# Patient Record
Sex: MALE | Race: BLACK OR AFRICAN AMERICAN | NOT HISPANIC OR LATINO | Employment: FULL TIME | URBAN - METROPOLITAN AREA
[De-identification: names, ages, dates, MRNs, and addresses within clinical notes are randomized per-mention and may not be internally consistent; named-entity substitution may affect disease eponyms.]

---

## 2017-08-01 ENCOUNTER — HOSPITAL ENCOUNTER (EMERGENCY)
Facility: OTHER | Age: 32
Discharge: HOME | End: 2017-08-01
Attending: EMERGENCY MEDICINE
Payer: COMMERCIAL

## 2017-08-01 VITALS
DIASTOLIC BLOOD PRESSURE: 85 MMHG | OXYGEN SATURATION: 100 % | BODY MASS INDEX: 34.69 KG/M2 | TEMPERATURE: 102 F | HEART RATE: 102 BPM | RESPIRATION RATE: 20 BRPM | HEIGHT: 74 IN | SYSTOLIC BLOOD PRESSURE: 139 MMHG | WEIGHT: 270.31 LBS

## 2017-08-01 DIAGNOSIS — L03.115 CELLULITIS OF RIGHT LOWER EXTREMITY: Primary | ICD-10-CM

## 2017-08-01 PROCEDURE — 25000003 PHARM REV CODE 250: Performed by: EMERGENCY MEDICINE

## 2017-08-01 PROCEDURE — 99283 EMERGENCY DEPT VISIT LOW MDM: CPT

## 2017-08-01 RX ORDER — ACETAMINOPHEN 500 MG
1000 TABLET ORAL
Status: COMPLETED | OUTPATIENT
Start: 2017-08-01 | End: 2017-08-01

## 2017-08-01 RX ORDER — IBUPROFEN 400 MG/1
800 TABLET ORAL
Status: COMPLETED | OUTPATIENT
Start: 2017-08-01 | End: 2017-08-01

## 2017-08-01 RX ORDER — IBUPROFEN 800 MG/1
800 TABLET ORAL 3 TIMES DAILY PRN
Qty: 20 TABLET | Refills: 0 | Status: SHIPPED | OUTPATIENT
Start: 2017-08-01 | End: 2023-09-05

## 2017-08-01 RX ORDER — CEPHALEXIN 500 MG/1
500 CAPSULE ORAL 4 TIMES DAILY
Qty: 20 CAPSULE | Refills: 0 | Status: SHIPPED | OUTPATIENT
Start: 2017-08-01 | End: 2017-08-06

## 2017-08-01 RX ORDER — CEPHALEXIN 500 MG/1
500 CAPSULE ORAL
Status: COMPLETED | OUTPATIENT
Start: 2017-08-01 | End: 2017-08-01

## 2017-08-01 RX ADMIN — IBUPROFEN 800 MG: 400 TABLET, FILM COATED ORAL at 10:08

## 2017-08-01 RX ADMIN — ACETAMINOPHEN 1000 MG: 500 TABLET ORAL at 10:08

## 2017-08-01 RX ADMIN — CEPHALEXIN 500 MG: 500 CAPSULE ORAL at 10:08

## 2017-08-02 NOTE — ED NOTES
Pt c/o abscess to the R shin w redness and tenderness x 3 days, fever since yesterday and a toothache  1 week. No trauma per pt.

## 2017-08-02 NOTE — ED PROVIDER NOTES
Encounter Date: 8/1/2017    SCRIBE #1 NOTE: I, Mariana Yu, am scribing for, and in the presence of,  Dr. Vaughn. I have scribed the entire note.       History     Chief Complaint   Patient presents with    Leg Pain     tightness, stiffness to the RLE x 3 days, fever since yesterday, toothache x 1 week     Time seen by provider: 10:37 PM    This is a 32 y.o. male who presents with complaint of right leg pain. He reports onset of symptoms was 7 days ago. The patient states he was walking in the park prior to onset of symptoms. He notes he began walking for exercise. The patient describes the pain as sharp. He notes associated swelling but denies any redness, open wounds, drainage, numbness, tingling or weakness in the right leg. He denies any trauma to the leg.   In addition the patient complains of fever. He reports onset of symptoms was 1 day ago. The patient denies any cough, congestion, body aches, sore throat or headache but admits to fatigue. He initially attributed the symptoms to his wife having a cold. The patient denies any other sick contacts. He does note he has roxane having left sided dental pain. The patient states the pain has been present for a few days. He denies any associated facial swelling, gum swelling or redness.       The history is provided by the patient.     Review of patient's allergies indicates:  No Known Allergies  History reviewed. No pertinent past medical history.  History reviewed. No pertinent surgical history.  Family History   Problem Relation Age of Onset    Diabetes Mother     Hypertension Father      Social History   Substance Use Topics    Smoking status: Current Every Day Smoker     Packs/day: 0.50    Smokeless tobacco: Never Used    Alcohol use Yes      Comment: socially     Review of Systems   Constitutional: Positive for fever. Negative for chills.   HENT: Positive for dental problem. Negative for congestion and sore throat.    Eyes: Negative for redness and  visual disturbance.   Respiratory: Negative for cough and shortness of breath.    Cardiovascular: Negative for chest pain and palpitations.   Gastrointestinal: Negative for abdominal pain, diarrhea, nausea and vomiting.   Genitourinary: Negative for dysuria.   Musculoskeletal: Negative for back pain.        Right leg pain. Right shin swelling   Skin: Negative for rash.   Neurological: Negative for weakness and headaches.   Psychiatric/Behavioral: Negative for confusion.       Physical Exam     Initial Vitals [08/01/17 2109]   BP Pulse Resp Temp SpO2   139/85 102 20 (!) 101.9 °F (38.8 °C) 100 %      MAP       103         Physical Exam    Nursing note and vitals reviewed.  Constitutional: He appears well-developed and well-nourished. He is not diaphoretic. No distress.   HENT:   Head: Normocephalic and atraumatic.   Right Ear: External ear normal.   Left Ear: External ear normal.   No effusion or erythema to bilateral ears. Tenderness over left maxillary 3rd molar. No Tremaine's angina. Oropharynx is clear and intact. Moist mucus membranes   Eyes: Conjunctivae and EOM are normal.   Neck: Normal range of motion. Neck supple.   Cardiovascular: Normal rate, regular rhythm and normal heart sounds. Exam reveals no gallop and no friction rub.    No murmur heard.  Pulmonary/Chest: Breath sounds normal. He has no wheezes. He has no rhonchi. He has no rales.   Abdominal: Soft. Bowel sounds are normal. There is no tenderness. There is no rebound and no guarding.   Musculoskeletal: Normal range of motion. He exhibits tenderness. He exhibits no edema.   Right anterior shin 2 cm area of warmth and tenderness.   No significant erythema or streaking. No posterior calf tenderness or swelling. No thigh tenderness or swelling.    Lymphadenopathy:     He has no cervical adenopathy.   Neurological: He is alert and oriented to person, place, and time. He has normal strength.   Skin: Skin is warm and dry. No rash noted.         ED Course    Procedures  Labs Reviewed - No data to display          Medical Decision Making:   ED Management:  Well-appearing patient presents with a primary complaint of fever and malaise.  He thought the fever was secondary to a head cold that his wife had, but he has no URI type symptoms.  He does have findings concerning for a early cellulitis on the anterior shin.  No signs of abscess at this time.  No erythema or streaking to suggest lymphangitis.  He also reports of dental pain, so certainly a dental infection could be contributing.  I started him on Keflex which should cover both.  Counseled for recheck in 3 days in clinic, return here if worse.  I've written him off work as a  for 2 days.    I did have an extensive talk regarding signs to return for and need for follow up. Patient expressed understanding and will monitor symptoms closely and follow-up as needed.    REYNA Vaughn M.D.  08/02/2017  4:04 AM              Scribe Attestation:   Scribe #1: I performed the above scribed service and the documentation accurately describes the services I performed. I attest to the accuracy of the note.    Attending Attestation:           Physician Attestation for Scribe:  Physician Attestation Statement for Scribe #1: I, Dr. Vaughn, reviewed documentation, as scribed by Mariana Yu in my presence, and it is both accurate and complete.                 ED Course     Clinical Impression:     1. Cellulitis of right lower extremity                               Todd Vaughn MD  08/02/17 0404

## 2019-01-22 ENCOUNTER — HOSPITAL ENCOUNTER (EMERGENCY)
Facility: OTHER | Age: 34
Discharge: HOME | End: 2019-01-22
Attending: EMERGENCY MEDICINE
Payer: COMMERCIAL

## 2019-01-22 VITALS
TEMPERATURE: 98 F | RESPIRATION RATE: 18 BRPM | OXYGEN SATURATION: 100 % | DIASTOLIC BLOOD PRESSURE: 81 MMHG | HEIGHT: 74 IN | HEART RATE: 64 BPM | SYSTOLIC BLOOD PRESSURE: 150 MMHG | WEIGHT: 260 LBS | BODY MASS INDEX: 33.37 KG/M2

## 2019-01-22 DIAGNOSIS — R03.0 ELEVATED BLOOD PRESSURE READING: ICD-10-CM

## 2019-01-22 DIAGNOSIS — D64.9 ANEMIA, UNSPECIFIED TYPE: ICD-10-CM

## 2019-01-22 DIAGNOSIS — R07.9 CHEST PAIN: Primary | ICD-10-CM

## 2019-01-22 LAB
ALBUMIN SERPL BCP-MCNC: 4 G/DL
ALP SERPL-CCNC: 52 U/L
ALT SERPL W/O P-5'-P-CCNC: 14 U/L
ANION GAP SERPL CALC-SCNC: 11 MMOL/L
ANISOCYTOSIS BLD QL SMEAR: ABNORMAL
AST SERPL-CCNC: 16 U/L
BASOPHILS # BLD AUTO: 0.03 K/UL
BASOPHILS NFR BLD: 0.5 %
BILIRUB SERPL-MCNC: 1 MG/DL
BUN SERPL-MCNC: 11 MG/DL
CALCIUM SERPL-MCNC: 9.5 MG/DL
CHLORIDE SERPL-SCNC: 106 MMOL/L
CO2 SERPL-SCNC: 22 MMOL/L
CREAT SERPL-MCNC: 1.2 MG/DL
DIFFERENTIAL METHOD: ABNORMAL
EOSINOPHIL # BLD AUTO: 0.1 K/UL
EOSINOPHIL NFR BLD: 1.1 %
ERYTHROCYTE [DISTWIDTH] IN BLOOD BY AUTOMATED COUNT: 19.7 %
EST. GFR  (AFRICAN AMERICAN): >60 ML/MIN/1.73 M^2
EST. GFR  (NON AFRICAN AMERICAN): >60 ML/MIN/1.73 M^2
GIANT PLATELETS BLD QL SMEAR: PRESENT
GLUCOSE SERPL-MCNC: 88 MG/DL
HCT VFR BLD AUTO: 32.3 %
HGB BLD-MCNC: 9.6 G/DL
HYPOCHROMIA BLD QL SMEAR: ABNORMAL
LYMPHOCYTES # BLD AUTO: 1.9 K/UL
LYMPHOCYTES NFR BLD: 28.6 %
MCH RBC QN AUTO: 20.3 PG
MCHC RBC AUTO-ENTMCNC: 29.7 G/DL
MCV RBC AUTO: 68 FL
MONOCYTES # BLD AUTO: 0.7 K/UL
MONOCYTES NFR BLD: 10.1 %
NEUTROPHILS # BLD AUTO: 3.9 K/UL
NEUTROPHILS NFR BLD: 59.7 %
OVALOCYTES BLD QL SMEAR: ABNORMAL
PLATELET # BLD AUTO: 380 K/UL
PLATELET BLD QL SMEAR: ABNORMAL
PMV BLD AUTO: 8 FL
POIKILOCYTOSIS BLD QL SMEAR: SLIGHT
POLYCHROMASIA BLD QL SMEAR: ABNORMAL
POTASSIUM SERPL-SCNC: 3.8 MMOL/L
PROT SERPL-MCNC: 8.2 G/DL
RBC # BLD AUTO: 4.74 M/UL
SODIUM SERPL-SCNC: 139 MMOL/L
TROPONIN I SERPL DL<=0.01 NG/ML-MCNC: <0.006 NG/ML
WBC # BLD AUTO: 6.64 K/UL

## 2019-01-22 PROCEDURE — 93010 ELECTROCARDIOGRAM REPORT: CPT | Mod: ,,, | Performed by: INTERNAL MEDICINE

## 2019-01-22 PROCEDURE — 93010 EKG 12-LEAD: ICD-10-PCS | Mod: ,,, | Performed by: INTERNAL MEDICINE

## 2019-01-22 PROCEDURE — 93005 ELECTROCARDIOGRAM TRACING: CPT

## 2019-01-22 PROCEDURE — 80053 COMPREHEN METABOLIC PANEL: CPT

## 2019-01-22 PROCEDURE — 99285 EMERGENCY DEPT VISIT HI MDM: CPT

## 2019-01-22 PROCEDURE — 84484 ASSAY OF TROPONIN QUANT: CPT

## 2019-01-22 PROCEDURE — 85025 COMPLETE CBC W/AUTO DIFF WBC: CPT

## 2019-01-22 RX ORDER — FERROUS SULFATE 325(65) MG
325 TABLET ORAL DAILY
Qty: 30 TABLET | Refills: 0 | Status: SHIPPED | OUTPATIENT
Start: 2019-01-22 | End: 2023-09-05

## 2019-01-22 RX ORDER — DOCUSATE SODIUM 100 MG/1
100 CAPSULE, LIQUID FILLED ORAL 2 TIMES DAILY PRN
Qty: 60 CAPSULE | Refills: 0 | Status: SHIPPED | OUTPATIENT
Start: 2019-01-22 | End: 2023-09-05

## 2019-01-22 NOTE — ED NOTES
Two patient identifiers have been checked and are correct.      Appearance: Pt awake, alert & oriented to person, place & time. Pt in no acute distress at present time. Pt is clean and well groomed with clothes appropriately fastened.   Skin: Skin warm, dry & intact. Color consistent with ethnicity. Mucous membranes moist. No breakdown or brusing noted.   Musculoskeletal: Patient moving all extremities well, no obvious swelling or deformities noted.   Respiratory: Respirations spontaneous, even, and non-labored. Visible chest rise noted. Airway is open and patent. No accessory muscle use noted.   Neurologic: Sensation is intact. Speech is clear and appropriate. Eyes open spontaneously, behavior appropriate to situation, follows commands, facial expression symmetrical, bilateral hand grasp equal and even, purposeful motor response noted.  Cardiac: All peripheral pulses present. No Bilateral lower extremity edema. Cap refill is <3 seconds. CP described as tightness reported earlier this PM, radiation to L shoulder.  Abdomen: Abdomen soft, non-tender to palpation.   : Pt reports no dysuria or hematuria.

## 2019-01-22 NOTE — ED NOTES
"Pt presents to ED via self with complaints of CP. Pt reporting midsternal CP with radiation to L shoulder after having an argument with a family member this PM. Pt also reported L facial numbness that presented along with CP. CP is described as tightness. Pt is tearful in ED room, states "I wonder if stress can cause this".  Pt denies N/V, SOB, H/A. AAOx4, RR even and unlabored. Will continue to monitor.   "

## 2019-01-22 NOTE — ED PROVIDER NOTES
"Encounter Date: 2019    SCRIBE #1 NOTE: I, Emmanuel Vargheseial, am scribing for, and in the presence of, Dr. Chance.       History     Chief Complaint   Patient presents with    Chest Pain     Pt came to the ED tonight c.o. chest pain and left arm numbness, s/p having an argument with his daughter and having to call police     Time seen by provider: 5:17 PM    This is a 33 y.o. male who presents with complaint of chest pain that began approximately one hour ago. The patient's wife reports that he "collapsed on the floor" with chest pain and numbness to his face and left hand. He states that he is also experiencing a cough and occasionally coughs up blood. He reports that he was on the phone with his child's mother and they got into an argument right before his symptoms began. She reports that she gave him three aspirin and then brought him to the ED. The patient reports that he has been under a lot of stress lately and that he has been feeling depressed. 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 He states that he is always experiencing chest pain like this, but it has never been that bad before. He denies fever, sore throat, shortness of breath, nausea, dysuria, and suicidal ideations. The patient reports that his father  of a heart attack. The patient reports that he smoked cigarettes in the past, but denies illicit drug use.       The history is provided by the patient and the spouse.     Review of patient's allergies indicates:  No Known Allergies  History reviewed. No pertinent past medical history.  Past Surgical History:   Procedure Laterality Date    CIRCUMCISION N/A 2013    Performed by George Leiva MD at [facility] OR [address] [place] [address]     Family History   Problem Relation Age of Onset    Diabetes Mother     Hypertension Father      Social History     Tobacco Use    Smoking status: Current Every Day Smoker     Types: Vaping with " nicotine    Smokeless tobacco: Never Used   Substance Use Topics    Alcohol use: Yes     Comment: socially    Drug use: No     Review of Systems   Constitutional: Negative for fever.   HENT: Negative for sore throat.    Respiratory: Positive for cough (occasionally coughing up blood). Negative for shortness of breath.    Cardiovascular: Positive for chest pain.   Gastrointestinal: Negative for nausea.   Genitourinary: Negative for dysuria.   Musculoskeletal: Negative for back pain.   Skin: Negative for rash.   Neurological: Positive for numbness (to his face and hand). Negative for weakness.   Hematological: Does not bruise/bleed easily.   Psychiatric/Behavioral: Negative for suicidal ideas.       Physical Exam     Initial Vitals [01/22/19 1658]   BP Pulse Resp Temp SpO2   (!) 158/93 71 16 98.3 °F (36.8 °C) 100 %      MAP       --         Physical Exam    Nursing note and vitals reviewed.  Constitutional: He appears well-developed and well-nourished. He is not diaphoretic. He appears distressed.   Tearful. Withdrawn affect.    HENT:   Head: Normocephalic and atraumatic.   Mouth/Throat: Oropharynx is clear and moist.   Eyes: Conjunctivae and EOM are normal. Pupils are equal, round, and reactive to light.   Neck: No JVD present.   Cardiovascular: Normal rate, regular rhythm and normal heart sounds. Exam reveals no gallop and no friction rub.    No murmur heard.  Pulmonary/Chest: Breath sounds normal. No respiratory distress. He has no wheezes. He has no rhonchi. He has no rales. He exhibits no tenderness.   Abdominal: Soft. There is no tenderness. There is no rebound and no guarding.   Musculoskeletal: Normal range of motion. He exhibits no edema or tenderness.   No calf tenderness.   Neurological: He is alert and oriented to person, place, and time.   Skin: Skin is warm and dry. No rash and no abscess noted. No erythema. No pallor.   Psychiatric: His behavior is normal. Judgment and thought content normal. He  exhibits a depressed mood. He expresses no suicidal ideation.         ED Course   Procedures  Labs Reviewed   CBC W/ AUTO DIFFERENTIAL - Abnormal; Notable for the following components:       Result Value    Hemoglobin 9.6 (*)     Hematocrit 32.3 (*)     MCV 68 (*)     MCH 20.3 (*)     MCHC 29.7 (*)     RDW 19.7 (*)     Platelets 380 (*)     MPV 8.0 (*)     All other components within normal limits   COMPREHENSIVE METABOLIC PANEL - Abnormal; Notable for the following components:    CO2 22 (*)     Alkaline Phosphatase 52 (*)     All other components within normal limits    Narrative:     Recoll. [id number] by SD at 01/22/2019 18:06, reason: Specimen   hemolyzed, Johanny Zamora RN   TROPONIN I    Narrative:     Recoll. [id number] by SD at 01/22/2019 18:06, reason: Specimen   hemolyzed, Johanny Zamora RN     EKG Readings: (Independently Interpreted)   Initial Reading: No STEMI.   Normal sinus rhythm at a rate of 71 bpm. Right axis deviation. Narrow QRS. No priors to compare it to.        Imaging Results          X-Ray Chest PA And Lateral (Final result)  Result time 01/22/19 19:03:25    Final result by Merry Bragg MD (01/22/19 19:03:25)                 Impression:      No intrathoracic disease identified.  The source of the patient's chest pain is not established on this study.      Electronically signed by: Merry Bragg MD  Date:    01/22/2019  Time:    19:03             Narrative:    EXAMINATION:  XR CHEST PA AND LATERAL    CLINICAL HISTORY:  Chest Pain;    TECHNIQUE:  PA and lateral views of the chest were performed.    COMPARISON:  None    FINDINGS:  Mediastinal structures are midline. Cardiac silhouette and pulmonary vascular distribution are normal.    Lung volumes are normal and symmetric. I detect no pulmonary disease, pleural fluid, lymph node enlargement, cardiac decompensation, pneumothorax, pneumomediastinum, pneumoperitoneum or significant osseous abnormality.                               X-Rays:   Independently Interpreted Readings:   Chest X-Ray: No cardiomegaly. No focal infiltrate. No pneumothorax.      Medical Decision Making:   Initial Assessment:   Urgent evaluation of 33-year-old gentleman here with complaints of left-sided chest pain that occurred at approximately 4:00 p.m..  Patient reports being overly stressed with his work as a , and interpersonal issues with an ex-wife, and his daughter, has had done he did phone conversation with them prior to the onset of his discomfort.  Patient then developed numbness sensation to the left upper extremity at that time.  Wife gave him aspirin, and presented to the emergency department.  Currently patient is chest pain-free, has no complaints of paresthesias, no shortness of breath, but reports having a chronic cough.  Patient does have family history of MI in his father, patient denies smoking, or drug abuse.  On exam patient is tearful, withdrawn, endorses depressed thoughts,, but no suicidal or homicidal ideation at this time.  Will plan to evaluate for cardiac etiology, with troponin, chest x-ray, and reassess.  Clinical Tests:   Lab Tests: Ordered and Reviewed  Radiological Study: Ordered and Reviewed  Medical Tests: Ordered and Reviewed  ED Management:   Patient made aware findings of anemia, significant from priors, the patient denies abdominal pain, no history of peptic ulcers, and no rectal bleeding.  Patient encouraged to follow with the PCP regarding these results, as well as given my concern for symptoms of depression.  Cardiac enzymes negative, and patient not desiring to stay for serial troponin, despite my suggestion that we cannot fully rule out a cardiac cause of his episode of discomfort at this time.  Patient wife seem reliable, agree that if pain returns, they will call 911 and return to the ED.            Scribe Attestation:   Scribe #1: I performed the above scribed service and the documentation accurately  describes the services I performed. I attest to the accuracy of the note.    Attending Attestation:           Physician Attestation for Scribe:  Physician Attestation Statement for Scribe #1: I, Dr. Chance, reviewed documentation, as scribed by Emmanuel Ponce in my presence, and it is both accurate and complete.                    Clinical Impression:     1. Chest pain    2. Anemia, unspecified type    3. Elevated blood pressure reading            Disposition:   Disposition: Discharged  Condition: Fair                        Lety Chance MD  01/22/19 1936

## 2021-05-20 ENCOUNTER — HOSPITAL ENCOUNTER (EMERGENCY)
Facility: OTHER | Age: 36
Discharge: HOME | End: 2021-05-20
Attending: EMERGENCY MEDICINE
Payer: COMMERCIAL

## 2021-05-20 VITALS
TEMPERATURE: 98 F | HEART RATE: 77 BPM | WEIGHT: 250 LBS | RESPIRATION RATE: 16 BRPM | HEIGHT: 74 IN | BODY MASS INDEX: 32.08 KG/M2 | OXYGEN SATURATION: 100 % | DIASTOLIC BLOOD PRESSURE: 78 MMHG | SYSTOLIC BLOOD PRESSURE: 130 MMHG

## 2021-05-20 DIAGNOSIS — I82.890 SUPERFICIAL VEIN THROMBOSIS: Primary | ICD-10-CM

## 2021-05-20 DIAGNOSIS — M79.606 LEG PAIN: ICD-10-CM

## 2021-05-20 DIAGNOSIS — D64.9 ANEMIA, UNSPECIFIED TYPE: ICD-10-CM

## 2021-05-20 LAB
ANION GAP SERPL CALC-SCNC: 7 MMOL/L (ref 8–16)
BASOPHILS # BLD AUTO: 0.04 K/UL (ref 0–0.2)
BASOPHILS NFR BLD: 0.6 % (ref 0–1.9)
BUN SERPL-MCNC: 11 MG/DL (ref 6–20)
CALCIUM SERPL-MCNC: 8.9 MG/DL (ref 8.7–10.5)
CHLORIDE SERPL-SCNC: 106 MMOL/L (ref 95–110)
CO2 SERPL-SCNC: 25 MMOL/L (ref 23–29)
CREAT SERPL-MCNC: 1.1 MG/DL (ref 0.5–1.4)
DIFFERENTIAL METHOD: ABNORMAL
EOSINOPHIL # BLD AUTO: 0.1 K/UL (ref 0–0.5)
EOSINOPHIL NFR BLD: 1.7 % (ref 0–8)
ERYTHROCYTE [DISTWIDTH] IN BLOOD BY AUTOMATED COUNT: 22.2 % (ref 11.5–14.5)
EST. GFR  (AFRICAN AMERICAN): >60 ML/MIN/1.73 M^2
EST. GFR  (NON AFRICAN AMERICAN): >60 ML/MIN/1.73 M^2
GLUCOSE SERPL-MCNC: 97 MG/DL (ref 70–110)
HCT VFR BLD AUTO: 30.2 % (ref 40–54)
HGB BLD-MCNC: 8.3 G/DL (ref 14–18)
IMM GRANULOCYTES # BLD AUTO: 0.02 K/UL (ref 0–0.04)
IMM GRANULOCYTES NFR BLD AUTO: 0.3 % (ref 0–0.5)
LYMPHOCYTES # BLD AUTO: 1.8 K/UL (ref 1–4.8)
LYMPHOCYTES NFR BLD: 24.8 % (ref 18–48)
MCH RBC QN AUTO: 18.2 PG (ref 27–31)
MCHC RBC AUTO-ENTMCNC: 27.5 G/DL (ref 32–36)
MCV RBC AUTO: 66 FL (ref 82–98)
MONOCYTES # BLD AUTO: 0.8 K/UL (ref 0.3–1)
MONOCYTES NFR BLD: 10.8 % (ref 4–15)
NEUTROPHILS # BLD AUTO: 4.5 K/UL (ref 1.8–7.7)
NEUTROPHILS NFR BLD: 61.8 % (ref 38–73)
NRBC BLD-RTO: 0 /100 WBC
PLATELET # BLD AUTO: 510 K/UL (ref 150–450)
PMV BLD AUTO: 8.1 FL (ref 9.2–12.9)
POTASSIUM SERPL-SCNC: 4 MMOL/L (ref 3.5–5.1)
RBC # BLD AUTO: 4.56 M/UL (ref 4.6–6.2)
SODIUM SERPL-SCNC: 138 MMOL/L (ref 136–145)
WBC # BLD AUTO: 7.22 K/UL (ref 3.9–12.7)

## 2021-05-20 PROCEDURE — 63600175 PHARM REV CODE 636 W HCPCS: Performed by: NURSE PRACTITIONER

## 2021-05-20 PROCEDURE — 96374 THER/PROPH/DIAG INJ IV PUSH: CPT

## 2021-05-20 PROCEDURE — 80048 BASIC METABOLIC PNL TOTAL CA: CPT | Performed by: NURSE PRACTITIONER

## 2021-05-20 PROCEDURE — 85025 COMPLETE CBC W/AUTO DIFF WBC: CPT | Performed by: NURSE PRACTITIONER

## 2021-05-20 PROCEDURE — 99284 EMERGENCY DEPT VISIT MOD MDM: CPT | Mod: 25

## 2021-05-20 RX ORDER — KETOROLAC TROMETHAMINE 30 MG/ML
30 INJECTION, SOLUTION INTRAMUSCULAR; INTRAVENOUS
Status: COMPLETED | OUTPATIENT
Start: 2021-05-20 | End: 2021-05-20

## 2021-05-20 RX ORDER — NAPROXEN 375 MG/1
375 TABLET ORAL 2 TIMES DAILY WITH MEALS
Qty: 60 TABLET | Refills: 0 | Status: SHIPPED | OUTPATIENT
Start: 2021-05-20 | End: 2023-09-05

## 2021-05-20 RX ORDER — DICLOFENAC SODIUM 10 MG/G
2 GEL TOPICAL 4 TIMES DAILY
Qty: 100 G | Refills: 0 | Status: SHIPPED | OUTPATIENT
Start: 2021-05-20 | End: 2023-09-05

## 2021-05-20 RX ADMIN — KETOROLAC TROMETHAMINE 30 MG: 30 INJECTION, SOLUTION INTRAMUSCULAR; INTRAVENOUS at 09:05

## 2021-05-21 ENCOUNTER — TELEPHONE (OUTPATIENT)
Dept: INTERNAL MEDICINE | Facility: CLINIC | Age: 36
End: 2021-05-21

## 2023-09-05 ENCOUNTER — HOSPITAL ENCOUNTER (OUTPATIENT)
Facility: OTHER | Age: 38
LOS: 1 days | Discharge: HOME | End: 2023-09-06
Attending: HOSPITALIST | Admitting: HOSPITALIST
Payer: COMMERCIAL

## 2023-09-05 DIAGNOSIS — I82.412 ACUTE DEEP VEIN THROMBOSIS (DVT) OF FEMORAL VEIN OF LEFT LOWER EXTREMITY: ICD-10-CM

## 2023-09-05 DIAGNOSIS — I82.409 DVT (DEEP VENOUS THROMBOSIS): ICD-10-CM

## 2023-09-05 DIAGNOSIS — M79.89 LEG SWELLING: ICD-10-CM

## 2023-09-05 DIAGNOSIS — I26.99 BILATERAL PULMONARY EMBOLISM: ICD-10-CM

## 2023-09-05 DIAGNOSIS — D50.9 IRON DEFICIENCY ANEMIA, UNSPECIFIED IRON DEFICIENCY ANEMIA TYPE: Primary | ICD-10-CM

## 2023-09-05 DIAGNOSIS — I82.402 ACUTE DEEP VEIN THROMBOSIS (DVT) OF LEFT LOWER EXTREMITY: ICD-10-CM

## 2023-09-05 LAB
ALBUMIN SERPL BCP-MCNC: 3.7 G/DL (ref 3.5–5.2)
ALP SERPL-CCNC: 50 U/L (ref 55–135)
ALT SERPL W/O P-5'-P-CCNC: 12 U/L (ref 10–44)
ANION GAP SERPL CALC-SCNC: 8 MMOL/L (ref 8–16)
APTT PPP: 27.4 SEC (ref 21–32)
AST SERPL-CCNC: 13 U/L (ref 10–40)
BASOPHILS # BLD AUTO: 0.04 K/UL (ref 0–0.2)
BASOPHILS # BLD AUTO: 0.04 K/UL (ref 0–0.2)
BASOPHILS NFR BLD: 0.5 % (ref 0–1.9)
BASOPHILS NFR BLD: 0.5 % (ref 0–1.9)
BILIRUB SERPL-MCNC: 0.8 MG/DL (ref 0.1–1)
BUN SERPL-MCNC: 10 MG/DL (ref 6–20)
CALCIUM SERPL-MCNC: 9.1 MG/DL (ref 8.7–10.5)
CHLORIDE SERPL-SCNC: 111 MMOL/L (ref 95–110)
CO2 SERPL-SCNC: 20 MMOL/L (ref 23–29)
CREAT SERPL-MCNC: 1.1 MG/DL (ref 0.5–1.4)
DIFFERENTIAL METHOD: ABNORMAL
DIFFERENTIAL METHOD: ABNORMAL
EOSINOPHIL # BLD AUTO: 0.2 K/UL (ref 0–0.5)
EOSINOPHIL # BLD AUTO: 0.2 K/UL (ref 0–0.5)
EOSINOPHIL NFR BLD: 2.3 % (ref 0–8)
EOSINOPHIL NFR BLD: 2.3 % (ref 0–8)
ERYTHROCYTE [DISTWIDTH] IN BLOOD BY AUTOMATED COUNT: 22.5 % (ref 11.5–14.5)
ERYTHROCYTE [DISTWIDTH] IN BLOOD BY AUTOMATED COUNT: 22.5 % (ref 11.5–14.5)
EST. GFR  (NO RACE VARIABLE): >60 ML/MIN/1.73 M^2
FERRITIN SERPL-MCNC: 13 NG/ML (ref 20–300)
GLUCOSE SERPL-MCNC: 116 MG/DL (ref 70–110)
HCT VFR BLD AUTO: 27.9 % (ref 40–54)
HCT VFR BLD AUTO: 28.9 % (ref 40–54)
HGB BLD-MCNC: 7.4 G/DL (ref 14–18)
HGB BLD-MCNC: 7.6 G/DL (ref 14–18)
IMM GRANULOCYTES # BLD AUTO: 0.02 K/UL (ref 0–0.04)
IMM GRANULOCYTES # BLD AUTO: 0.02 K/UL (ref 0–0.04)
IMM GRANULOCYTES NFR BLD AUTO: 0.3 % (ref 0–0.5)
IMM GRANULOCYTES NFR BLD AUTO: 0.3 % (ref 0–0.5)
INR PPP: 1 (ref 0.8–1.2)
IRON SERPL-MCNC: 23 UG/DL (ref 45–160)
LDH SERPL L TO P-CCNC: 162 U/L (ref 110–260)
LYMPHOCYTES # BLD AUTO: 1.5 K/UL (ref 1–4.8)
LYMPHOCYTES # BLD AUTO: 1.8 K/UL (ref 1–4.8)
LYMPHOCYTES NFR BLD: 19.9 % (ref 18–48)
LYMPHOCYTES NFR BLD: 24.1 % (ref 18–48)
MCH RBC QN AUTO: 17.2 PG (ref 27–31)
MCH RBC QN AUTO: 17.4 PG (ref 27–31)
MCHC RBC AUTO-ENTMCNC: 26.3 G/DL (ref 32–36)
MCHC RBC AUTO-ENTMCNC: 26.5 G/DL (ref 32–36)
MCV RBC AUTO: 66 FL (ref 82–98)
MCV RBC AUTO: 66 FL (ref 82–98)
MONOCYTES # BLD AUTO: 0.6 K/UL (ref 0.3–1)
MONOCYTES # BLD AUTO: 0.8 K/UL (ref 0.3–1)
MONOCYTES NFR BLD: 10.7 % (ref 4–15)
MONOCYTES NFR BLD: 7.9 % (ref 4–15)
NEUTROPHILS # BLD AUTO: 4.7 K/UL (ref 1.8–7.7)
NEUTROPHILS # BLD AUTO: 5.3 K/UL (ref 1.8–7.7)
NEUTROPHILS NFR BLD: 62.1 % (ref 38–73)
NEUTROPHILS NFR BLD: 69.1 % (ref 38–73)
NRBC BLD-RTO: 0 /100 WBC
NRBC BLD-RTO: 0 /100 WBC
PLATELET # BLD AUTO: 345 K/UL (ref 150–450)
PLATELET # BLD AUTO: 352 K/UL (ref 150–450)
PMV BLD AUTO: 8.6 FL (ref 9.2–12.9)
PMV BLD AUTO: 8.9 FL (ref 9.2–12.9)
POTASSIUM SERPL-SCNC: 3.8 MMOL/L (ref 3.5–5.1)
PROT SERPL-MCNC: 7.4 G/DL (ref 6–8.4)
PROTHROMBIN TIME: 11.2 SEC (ref 9–12.5)
RBC # BLD AUTO: 4.25 M/UL (ref 4.6–6.2)
RBC # BLD AUTO: 4.41 M/UL (ref 4.6–6.2)
RETICS/RBC NFR AUTO: 1.8 % (ref 0.4–2)
SATURATED IRON: 6 % (ref 20–50)
SODIUM SERPL-SCNC: 139 MMOL/L (ref 136–145)
TOTAL IRON BINDING CAPACITY: 414 UG/DL (ref 250–450)
TRANSFERRIN SERPL-MCNC: 280 MG/DL (ref 200–375)
WBC # BLD AUTO: 7.48 K/UL (ref 3.9–12.7)
WBC # BLD AUTO: 7.72 K/UL (ref 3.9–12.7)

## 2023-09-05 PROCEDURE — 84466 ASSAY OF TRANSFERRIN: CPT | Performed by: PHYSICIAN ASSISTANT

## 2023-09-05 PROCEDURE — 85610 PROTHROMBIN TIME: CPT | Performed by: PHYSICIAN ASSISTANT

## 2023-09-05 PROCEDURE — 36415 COLL VENOUS BLD VENIPUNCTURE: CPT | Performed by: NURSE PRACTITIONER

## 2023-09-05 PROCEDURE — 83540 ASSAY OF IRON: CPT | Performed by: PHYSICIAN ASSISTANT

## 2023-09-05 PROCEDURE — 80053 COMPREHEN METABOLIC PANEL: CPT | Performed by: PHYSICIAN ASSISTANT

## 2023-09-05 PROCEDURE — 36415 COLL VENOUS BLD VENIPUNCTURE: CPT | Performed by: PHYSICIAN ASSISTANT

## 2023-09-05 PROCEDURE — 96376 TX/PRO/DX INJ SAME DRUG ADON: CPT | Mod: 59

## 2023-09-05 PROCEDURE — G0378 HOSPITAL OBSERVATION PER HR: HCPCS

## 2023-09-05 PROCEDURE — 85025 COMPLETE CBC W/AUTO DIFF WBC: CPT | Performed by: PHYSICIAN ASSISTANT

## 2023-09-05 PROCEDURE — 99285 EMERGENCY DEPT VISIT HI MDM: CPT | Mod: 25

## 2023-09-05 PROCEDURE — 83615 LACTATE (LD) (LDH) ENZYME: CPT | Performed by: NURSE PRACTITIONER

## 2023-09-05 PROCEDURE — 36415 COLL VENOUS BLD VENIPUNCTURE: CPT | Performed by: HOSPITALIST

## 2023-09-05 PROCEDURE — 99222 PR INITIAL HOSPITAL CARE,LEVL II: ICD-10-PCS | Mod: ,,, | Performed by: NURSE PRACTITIONER

## 2023-09-05 PROCEDURE — 85730 THROMBOPLASTIN TIME PARTIAL: CPT | Mod: 91 | Performed by: HOSPITALIST

## 2023-09-05 PROCEDURE — 85730 THROMBOPLASTIN TIME PARTIAL: CPT | Performed by: PHYSICIAN ASSISTANT

## 2023-09-05 PROCEDURE — 96365 THER/PROPH/DIAG IV INF INIT: CPT

## 2023-09-05 PROCEDURE — 63600175 PHARM REV CODE 636 W HCPCS: Performed by: PHYSICIAN ASSISTANT

## 2023-09-05 PROCEDURE — 96366 THER/PROPH/DIAG IV INF ADDON: CPT

## 2023-09-05 PROCEDURE — 85025 COMPLETE CBC W/AUTO DIFF WBC: CPT | Mod: 91 | Performed by: PHYSICIAN ASSISTANT

## 2023-09-05 PROCEDURE — 25500020 PHARM REV CODE 255: Performed by: HOSPITALIST

## 2023-09-05 PROCEDURE — 82728 ASSAY OF FERRITIN: CPT | Performed by: PHYSICIAN ASSISTANT

## 2023-09-05 PROCEDURE — 99222 1ST HOSP IP/OBS MODERATE 55: CPT | Mod: ,,, | Performed by: NURSE PRACTITIONER

## 2023-09-05 PROCEDURE — 85045 AUTOMATED RETICULOCYTE COUNT: CPT | Performed by: NURSE PRACTITIONER

## 2023-09-05 RX ORDER — HEPARIN SODIUM,PORCINE/D5W 25000/250
0-40 INTRAVENOUS SOLUTION INTRAVENOUS CONTINUOUS
Status: DISCONTINUED | OUTPATIENT
Start: 2023-09-05 | End: 2023-09-06

## 2023-09-05 RX ORDER — HYDROCODONE BITARTRATE AND ACETAMINOPHEN 10; 325 MG/1; MG/1
1 TABLET ORAL EVERY 6 HOURS PRN
Status: DISCONTINUED | OUTPATIENT
Start: 2023-09-05 | End: 2023-09-06 | Stop reason: HOSPADM

## 2023-09-05 RX ORDER — ACETAMINOPHEN 325 MG/1
650 TABLET ORAL EVERY 6 HOURS PRN
Status: DISCONTINUED | OUTPATIENT
Start: 2023-09-05 | End: 2023-09-06 | Stop reason: HOSPADM

## 2023-09-05 RX ADMIN — HEPARIN SODIUM 18 UNITS/KG/HR: 10000 INJECTION, SOLUTION INTRAVENOUS at 03:09

## 2023-09-05 RX ADMIN — IOHEXOL 100 ML: 350 INJECTION, SOLUTION INTRAVENOUS at 02:09

## 2023-09-05 RX ADMIN — HEPARIN SODIUM 21 UNITS/KG/HR: 10000 INJECTION, SOLUTION INTRAVENOUS at 09:09

## 2023-09-05 NOTE — ASSESSMENT & PLAN NOTE
Hgb 7.4, Hct 27.9, Plts 345, MCV 66  Patient denies blood loss, dark stool  -Check occult   -Iron/TIBC/Transferrin/LDH/Retics pending

## 2023-09-05 NOTE — PLAN OF CARE
1500  Pt sent for CT.    5:25 PM  Aptt at 27.4. hep gtt will be adjusted per high nom, as ordered.  Q 6 lab reordered for 1900.   Latest Reference Range & Units 09/05/23 12:54   aPTT 21.0 - 32.0 sec 27.4       6:02 PM  Heparin Dose adjusted. Aptt to be drawn at 23:46.  Telemetry contd.   Resting comfortably in bed at this time.  VSS on RA and afebrile this shift. Good appetite and good UOP this shift, independent to urinal.Repositions self independently in bed.  Free from injury or skin breakdown; Fall precautions maintained and call light in reach.  POC updated questions answered and comments acknowledged.  Purposeful hourly rounding completed this shift.

## 2023-09-05 NOTE — H&P
"[facility] ([address] Medicine  History & Physical    Patient Name: Andi Min  MRN: [id number]  Patient Class: OP- Observation  Admission Date: 9/5/2023  Attending Physician: Buster Branch MD   Primary Care Provider: Jackie Primary Doctor         Patient information was obtained from patient, past medical records and ER records.     Subjective:     Principal Problem:Acute deep vein thrombosis (DVT) of left lower extremity    Chief Complaint:   Chief Complaint   Patient presents with    Leg Pain     Pt reporting L leg pain and swelling x 1 week. Denies injury to area. Upon assessment, L leg appears swollen from thigh to ankle. Pt ambulatory upon arrival to ED. Pt reporting same L leg pain x 2 years ago and was told "I maybe have a torn meniscus and I was supposed to have a MRI done, but didn't go."         HPI: Mr Escamilla is a 38 year old male with a previous medical history of left torn meniscus, a superficial venous thrombosis 2 years ago, and he does not take any medications. Mr Escamilla came to the ED for pain and swelling to his left lower leg. It began about one week ago. He denies any trauma, prolonged immobilization, or other injury to his left leg. The pain and swelling does affect his mobility. The pain is worse with movement. He did take an extended car ride over the weekend to [place] this weekend but he already had the swelling and pain.  Patient denies back pain, decreased sensation, and denies shortness of breath.     In the emergency room, Mr Escamilla had an ultrasound of his lower extremities which revealed a nearly occlusive thrombosis involving the left femoral vein, popliteal vein, and posterior tibial and peroneal veins. Interventional radiology was contact by ED provider. Patient to be started on heparin drip and have a thrombectomy tomorrow. Plan of care discussed with patient and his wife who is a bedside. Patient admitted to hospital medicine for further " management.       History reviewed. No pertinent past medical history.    History reviewed. No pertinent surgical history.    Review of patient's allergies indicates:  No Known Allergies    No current facility-administered medications on file prior to encounter.     Current Outpatient Medications on File Prior to Encounter   Medication Sig    [DISCONTINUED] diclofenac sodium (VOLTAREN) 1 % Gel Apply 2 g topically 4 (four) times daily. for 10 days    [DISCONTINUED] docusate sodium (COLACE) 100 MG capsule Take 1 capsule (100 mg total) by mouth 2 (two) times daily as needed for Constipation.    [DISCONTINUED] ferrous sulfate (FEOSOL) 325 mg (65 mg iron) Tab tablet Take 1 tablet (325 mg total) by mouth once daily.    [DISCONTINUED] ibuprofen (ADVIL,MOTRIN) 800 MG tablet Take 1 tablet (800 mg total) by mouth 3 (three) times daily as needed.    [DISCONTINUED] naproxen (NAPROSYN) 375 MG tablet Take 1 tablet (375 mg total) by mouth 2 (two) times daily with meals.     Family History       Problem Relation (Age of Onset)    Diabetes Mother    Hypertension Father          Tobacco Use    Smoking status: Every Day     Types: Vaping with nicotine    Smokeless tobacco: Never   Substance and Sexual Activity    Alcohol use: Yes     Comment: socially    Drug use: No    Sexual activity: Yes     Partners: Female     Review of Systems   Constitutional:  Negative for appetite change and fatigue.   Respiratory:  Negative for cough, chest tightness and shortness of breath.    Cardiovascular:  Positive for leg swelling. Negative for chest pain.   Gastrointestinal:  Negative for abdominal distention, abdominal pain, blood in stool, constipation, diarrhea and vomiting.   Genitourinary:  Negative for difficulty urinating.   Musculoskeletal:  Negative for arthralgias and back pain.   Neurological:  Negative for dizziness.     Objective:     Vital Signs (Most Recent):  Temp: 98.6 °F (37 °C) (09/05/23 1350)  Pulse: 80 (09/05/23  1350)  Resp: 18 (09/05/23 1350)  BP: 132/73 (09/05/23 1350)  SpO2: 100 % (09/05/23 1350) Vital Signs (24h Range):  Temp:  [98.1 °F (36.7 °C)-98.6 °F (37 °C)] 98.6 °F (37 °C)  Pulse:  [77-91] 80  Resp:  [15-18] 18  SpO2:  [99 %-100 %] 100 %  BP: (130-133)/(71-75) 132/73     Weight: 117.9 kg (259 lb 14.8 oz)  Body mass index is 33.37 kg/m².     Physical Exam  Vitals reviewed.   HENT:      Head: Normocephalic.   Eyes:      Pupils: Pupils are equal, round, and reactive to light.   Cardiovascular:      Rate and Rhythm: Normal rate.      Comments: BL dorsalis pedis 2+  Pulmonary:      Effort: Pulmonary effort is normal.      Breath sounds: Normal breath sounds. No wheezing.   Chest:      Chest wall: No tenderness.   Abdominal:      General: There is no distension.      Tenderness: There is no abdominal tenderness. There is no guarding.   Musculoskeletal:         General: Tenderness (LLE) present. Normal range of motion.      Left lower leg: Edema (nonpitting) present.   Skin:     Findings: Erythema (LLE) present.   Neurological:      General: No focal deficit present.      Mental Status: He is alert.              CRANIAL NERVES     CN III, IV, VI   Pupils are equal, round, and reactive to light.       Significant Labs: All pertinent labs within the past 24 hours have been reviewed.  BMP:   Recent Labs   Lab 09/05/23  1047   *      K 3.8   *   CO2 20*   BUN 10   CREATININE 1.1   CALCIUM 9.1     CBC:   Recent Labs   Lab 09/05/23  1047 09/05/23  1254   WBC 7.72 7.48   HGB 7.6* 7.4*   HCT 28.9* 27.9*    345     CMP:   Recent Labs   Lab 09/05/23  1047      K 3.8   *   CO2 20*   *   BUN 10   CREATININE 1.1   CALCIUM 9.1   PROT 7.4   ALBUMIN 3.7   BILITOT 0.8   ALKPHOS 50*   AST 13   ALT 12   ANIONGAP 8       Significant Imaging: I have reviewed all pertinent imaging results/findings within the past 24 hours.  US Lower Extremity Veins Left  Narrative: EXAMINATION:  US LOWER EXTREMITY  VEINS LEFT    CLINICAL HISTORY:  Other specified soft tissue disorders    TECHNIQUE:  Duplex and color flow Doppler evaluation and graded compression of the left lower extremity veins was performed.    COMPARISON:  05/20/2021    FINDINGS:  The common femoral is patent.  The left mid and distal femoral vein are thrombosed.  The left popliteal vein is thrombosed.  The left posterior tibial and peroneal veins are thrombosed.  The anterior tibial vein is patent.  The greater saphenous vein is patent.    Contralateral CFV: The contralateral (right) common femoral vein is patent and free of thrombus.    Miscellaneous: None  Impression: Nearly occlusive thrombus involving the left femoral vein, popliteal vein, posterior tibial and peroneal veins.    Result relayed to Nurse Davis providing care for this patient which will relay to attending.  11:35.    No EPIC response from the ordering provider.    I could not reach Jolene RICHARDS by phone.    Electronically signed by: Zena Armendariz MD  Date:    09/05/2023  Time:    11:36        Assessment/Plan:     * Acute deep vein thrombosis (DVT) of left lower extremity  Left lower extremity swelling and erythema.  US reports nearly occlusive thrombus involving the left femoral vein, popliteal vein, posterior tibial and peroneal veins.  Interventional Radiology to perform a thrombectomy on Wednesday. Heparin drip started at this time for anticoagulation. Evaluating for acquired vs inherited etiology. Patient's father has had a DVT in the past. Patient denies trauma, immobilization.  CT abdomen pelvis and CT non-coronary ordered. Will refer to hematology outpatient.       Iron deficiency anemia  Hgb 7.4, Hct 27.9, Plts 345, MCV 66  Patient denies blood loss, dark stool  -Check occult   -Iron/TIBC/Transferrin/LDH/Retics pending          VTE Risk Mitigation (From admission, onward)         Ordered     heparin 25,000 units in dextrose 5% (100 units/ml) IV bolus from bag  - ADDITIONAL PRN BOLUS - 60 units/kg  As needed (PRN)        Question:  Heparin Infusion Adjustment (DO NOT MODIFY ANSWER)  Answer:  [email or web address]\epic\Images\Pharmacy\HeparinInfusions\heparin HIGH INTENSITY nomogram for [facility] [id number]    09/05/23 1222     heparin 25,000 units in dextrose 5% (100 units/ml) IV bolus from bag - ADDITIONAL PRN BOLUS - 30 units/kg  As needed (PRN)        Question:  Heparin Infusion Adjustment (DO NOT MODIFY ANSWER)  Answer:  \\[email or web address]\epic\Images\Pharmacy\HeparinInfusions\heparin HIGH INTENSITY nomogram for [facility] [id number].pdf    09/05/23 1222     heparin 25,000 units in dextrose 5% 250 mL (100 units/mL) infusion HIGH INTENSITY nomogram - [facility]  Continuous        Question Answer Comment   Heparin Infusion Adjustment (DO NOT MODIFY ANSWER) \\[email or web address]\epic\Images\Pharmacy\HeparinInfusions\heparin HIGH INTENSITY nomogram for [facility] [id number].pdf    Begin at (in units/kg/hr) 18        09/05/23 1222                    Alysha Morse DNP  Department of Hospital Medicine  [facility] - Med Surg ([address])

## 2023-09-05 NOTE — ED NOTES
Agreed with Claudia in 3 south that I would bring the pt up and we would start the heparin together

## 2023-09-05 NOTE — LETTER
September 6, 2023         [address]  [address]LO[address]  [facility] [address]  Phone: [phone]  Fax: [phone]       Patient: Andi Min   YOB: 1985  Date of Visit: 09/06/2023    To Whom It May Concern:    Xavier Min  was at [facility] on 9/05 through 09/06/2023. He may return to work/school on 9/13/2023 with restrictions. No physical conditioning per [employer] form 50 definition. Limited, light, clerical, office duty is permitted. Patient may be removed from limited duty by primary care physician at follow up appointment.  If you have any questions or concerns, or if I can be of further assistance, please do not hesitate to contact me at [phone].    Sincerely,    Alysha Morse, DNP

## 2023-09-05 NOTE — CONSULTS
Inpatient consult to Interventional Radiology  Consult performed by: Bernie Valerio MD  Consult ordered by: Alysha Morse DNP  Reason for consult: Acute DVT      Consult Note  Interventional Radiology    Consult Requested By: ED    Reason for Consult: Acute LLE DVT    SUBJECTIVE:     Chief Complaint: LLE pain and swelling    History of Present Illness: 38M PMHx left knee injury and prior RLE superficial venous thrombosis who presented with LLE pain and swelling x1 week. US performed in the ED demonstrates extensive nearly occlusive thrombus of the left femoral, poplitea, posterior tibial, and peroneal veins. Denies trauma. Several hour car ride over the weekend, but symptoms were present prior to trip. Denies weakness, numbness, or paresthesias. No chest pain or dyspnea. Reports father had trauma-related DVT.     History reviewed. No pertinent past medical history.  History reviewed. No pertinent surgical history.  Family History   Problem Relation Age of Onset    Diabetes Mother     Hypertension Father      Social History     Tobacco Use    Smoking status: Every Day     Types: Vaping with nicotine    Smokeless tobacco: Never   Substance Use Topics    Alcohol use: Yes     Comment: socially    Drug use: No       Review of Systems:  Constitutional/General:No fever, chills, change in appetite or weight loss.  Hematological/Immuno: no known coagulopathies  Respiratory: no shortness of breath  Cardiovascular: no chest pain  Gastrointestinal: no abdominal pain  Genito-Urinary: no dysuria  Musculoskeletal: LLE pain  Skin: Negative for rash, itching, pigmentation changes, nail or hair changes.  Neurological: no LLE numbness, tingling, paresthesias  Psychiatric: normal mood/affect, good insight/judgement      OBJECTIVE:     Vital Signs Range (Last 24H):  Temp:  [98.1 °F (36.7 °C)-98.6 °F (37 °C)]   Pulse:  [71-91]   Resp:  [15-18]   BP: (130-134)/(71-75)   SpO2:  [99 %-100 %]     Physical Exam:  General- Patient  alert and oriented x3 in NAD  ENT- EOMI  Neck- No masses  CV- Normal rate, regular rhythm  Resp-  No increased WOB  GI- Non tender/non-distended  Extrem- mild asymmetric LLE non-pitting edema extending from the left calf to the ankle; DP pulses 2+  Derm- No rashes, masses, or lesions noted  Neuro-  No focal deficits noted. Intact sensation bilateral feet    Physical Exam  Musculoskeletal:      Left lower leg: Swelling and tenderness present.      Comments: pain       Body mass index is 33.37 kg/m².    Scheduled Meds:   Continuous Infusions:    heparin (porcine) in D5W 18 Units/kg/hr (09/05/23 1504)     PRN Meds:acetaminophen, heparin (PORCINE), heparin (PORCINE), HYDROcodone-acetaminophen    Allergies: Review of patient's allergies indicates:  No Known Allergies    Labs:  Recent Labs   Lab 09/05/23  1254   INR 1.0       Recent Labs   Lab 09/05/23  1254   WBC 7.48   HGB 7.4*   HCT 27.9*   MCV 66*         Recent Labs   Lab 09/05/23  1047   *      K 3.8   *   CO2 20*   BUN 10   CREATININE 1.1   CALCIUM 9.1   ALT 12   AST 13   ALBUMIN 3.7   BILITOT 0.8       Vitals (Most Recent):  Temp: 98.6 °F (37 °C) (09/05/23 1540)  Pulse: 71 (09/05/23 1601)  Resp: 18 (09/05/23 1540)  BP: 134/73 (09/05/23 1540)  SpO2: 100 % (09/05/23 1540)    ASA: 2  Mallampati: 2    ASSESSMENT/PLAN:     38M PMHx left knee injury and prior RLE superficial venous thrombosis who presented with LLE pain and swelling x1 week. Found to have acute LLE DVT. No evidence of iliocaval extent on CT A/P. Concern for PE on CT PE although suboptimal contrast bolus timing. Discussed thrombectomy (including indications, technique, and risks) with patient and wife, who are amenable to procedure. Patient states he would like to read the consent thoroughly prior to signing. All questions answered.   - Plan for LLE venography and thrombectomy 9/6  - Continue AC. Please check PTT in AM.  - [email or web address]    Active Hospital Problems    Diagnosis  POA     *Acute deep vein thrombosis (DVT) of left lower extremity [I82.402]  Yes    Iron deficiency anemia [D50.9]  Yes      Resolved Hospital Problems   No resolved problems to display.           Bernie Valerio MD

## 2023-09-05 NOTE — SUBJECTIVE & OBJECTIVE
History reviewed. No pertinent past medical history.    History reviewed. No pertinent surgical history.    Review of patient's allergies indicates:  No Known Allergies    No current facility-administered medications on file prior to encounter.     Current Outpatient Medications on File Prior to Encounter   Medication Sig    [DISCONTINUED] diclofenac sodium (VOLTAREN) 1 % Gel Apply 2 g topically 4 (four) times daily. for 10 days    [DISCONTINUED] docusate sodium (COLACE) 100 MG capsule Take 1 capsule (100 mg total) by mouth 2 (two) times daily as needed for Constipation.    [DISCONTINUED] ferrous sulfate (FEOSOL) 325 mg (65 mg iron) Tab tablet Take 1 tablet (325 mg total) by mouth once daily.    [DISCONTINUED] ibuprofen (ADVIL,MOTRIN) 800 MG tablet Take 1 tablet (800 mg total) by mouth 3 (three) times daily as needed.    [DISCONTINUED] naproxen (NAPROSYN) 375 MG tablet Take 1 tablet (375 mg total) by mouth 2 (two) times daily with meals.     Family History       Problem Relation (Age of Onset)    Diabetes Mother    Hypertension Father          Tobacco Use    Smoking status: Every Day     Types: Vaping with nicotine    Smokeless tobacco: Never   Substance and Sexual Activity    Alcohol use: Yes     Comment: socially    Drug use: No    Sexual activity: Yes     Partners: Female     Review of Systems   Constitutional:  Negative for appetite change and fatigue.   Respiratory:  Negative for cough, chest tightness and shortness of breath.    Cardiovascular:  Positive for leg swelling. Negative for chest pain.   Gastrointestinal:  Negative for abdominal distention, abdominal pain, blood in stool, constipation, diarrhea and vomiting.   Genitourinary:  Negative for difficulty urinating.   Musculoskeletal:  Negative for arthralgias and back pain.   Neurological:  Negative for dizziness.     Objective:     Vital Signs (Most Recent):  Temp: 98.6 °F (37 °C) (09/05/23 1350)  Pulse: 80 (09/05/23 1350)  Resp: 18 (09/05/23 1350)  BP:  132/73 (09/05/23 1350)  SpO2: 100 % (09/05/23 1350) Vital Signs (24h Range):  Temp:  [98.1 °F (36.7 °C)-98.6 °F (37 °C)] 98.6 °F (37 °C)  Pulse:  [77-91] 80  Resp:  [15-18] 18  SpO2:  [99 %-100 %] 100 %  BP: (130-133)/(71-75) 132/73     Weight: 117.9 kg (259 lb 14.8 oz)  Body mass index is 33.37 kg/m².     Physical Exam  Vitals reviewed.   HENT:      Head: Normocephalic.   Eyes:      Pupils: Pupils are equal, round, and reactive to light.   Cardiovascular:      Rate and Rhythm: Normal rate.      Comments: BL dorsalis pedis 2+  Pulmonary:      Effort: Pulmonary effort is normal.      Breath sounds: Normal breath sounds. No wheezing.   Chest:      Chest wall: No tenderness.   Abdominal:      General: There is no distension.      Tenderness: There is no abdominal tenderness. There is no guarding.   Musculoskeletal:         General: Tenderness (LLE) present. Normal range of motion.      Left lower leg: Edema (nonpitting) present.   Skin:     Findings: Erythema (LLE) present.   Neurological:      General: No focal deficit present.      Mental Status: He is alert.              CRANIAL NERVES     CN III, IV, VI   Pupils are equal, round, and reactive to light.       Significant Labs: All pertinent labs within the past 24 hours have been reviewed.  BMP:   Recent Labs   Lab 09/05/23  1047   *      K 3.8   *   CO2 20*   BUN 10   CREATININE 1.1   CALCIUM 9.1     CBC:   Recent Labs   Lab 09/05/23  1047 09/05/23  1254   WBC 7.72 7.48   HGB 7.6* 7.4*   HCT 28.9* 27.9*    345     CMP:   Recent Labs   Lab 09/05/23  1047      K 3.8   *   CO2 20*   *   BUN 10   CREATININE 1.1   CALCIUM 9.1   PROT 7.4   ALBUMIN 3.7   BILITOT 0.8   ALKPHOS 50*   AST 13   ALT 12   ANIONGAP 8       Significant Imaging: I have reviewed all pertinent imaging results/findings within the past 24 hours.  US Lower Extremity Veins Left  Narrative: EXAMINATION:  US LOWER EXTREMITY VEINS LEFT    CLINICAL  HISTORY:  Other specified soft tissue disorders    TECHNIQUE:  Duplex and color flow Doppler evaluation and graded compression of the left lower extremity veins was performed.    COMPARISON:  05/20/2021    FINDINGS:  The common femoral is patent.  The left mid and distal femoral vein are thrombosed.  The left popliteal vein is thrombosed.  The left posterior tibial and peroneal veins are thrombosed.  The anterior tibial vein is patent.  The greater saphenous vein is patent.    Contralateral CFV: The contralateral (right) common femoral vein is patent and free of thrombus.    Miscellaneous: None  Impression: Nearly occlusive thrombus involving the left femoral vein, popliteal vein, posterior tibial and peroneal veins.    Result relayed to Nurse Davis providing care for this patient which will relay to attending.  11:35.    No EPIC response from the ordering provider.    I could not reach Jolene RICHARDS by phone.    Electronically signed by: Zena Armendariz MD  Date:    09/05/2023  Time:    11:36

## 2023-09-05 NOTE — HPI
Mr Escamilla is a 38 year old male with a previous medical history of left torn meniscus, a superficial venous thrombosis 2 years ago, and he does not take any medications. Mr Escamilla came to the ED for pain and swelling to his left lower leg. It began about one week ago. He denies any trauma, prolonged immobilization, or other injury to his left leg. The pain and swelling does affect his mobility. The pain is worse with movement. He did take an extended car ride over the weekend to [place] this weekend but he already had the swelling and pain.  Patient denies back pain, decreased sensation, and denies shortness of breath.     In the emergency room, Mr Escamilla had an ultrasound of his lower extremities which revealed a nearly occlusive thrombosis involving the left femoral vein, popliteal vein, and posterior tibial and peroneal veins. Interventional radiology was contact by ED provider. Patient to be started on heparin drip and have a thrombectomy tomorrow. Plan of care discussed with patient and his wife who is a bedside. Patient admitted to hospital medicine for further management.

## 2023-09-05 NOTE — FIRST PROVIDER EVALUATION
" Emergency Department TeleTriage Encounter Note      CHIEF COMPLAINT    Chief Complaint   Patient presents with    Leg Pain     Pt reporting L leg pain and swelling x 1 week. Denies injury to area. Upon assessment, L leg appears swollen from thigh to ankle. Pt ambulatory upon arrival to ED. Pt reporting same L leg pain x 2 years ago and was told "I maybe have a torn meniscus and I was supposed to have a MRI done, but didn't go."        VITAL SIGNS   Initial Vitals [09/05/23 0941]   BP Pulse Resp Temp SpO2   130/71 91 18 98.1 °F (36.7 °C) 99 %      MAP       --            ALLERGIES    Review of patient's allergies indicates:  No Known Allergies    PROVIDER TRIAGE NOTE  Patient presents with complaint of left leg swelling for one week. Reports no redness/warmth. No fever. No SOB.      Phy:   Constitutional: well nourished, well developed, appearing stated age, NAD   HEENT: NCAT, symmetrical lids, No obvious facial deformity.  Normal phonation. Normal Conjunctiva   Neck: NAROM   Respiratory: Normal effort.  No obvious use of accessory muscles   Musculoskeletal: Moved upper extremities well   Neuro: Alert, answers questions appropriately    Psych: appropriate mood and affect      Initial orders will be placed and care will be transferred to an alternate provider when patient is roomed for a full evaluation. Any additional orders and the final disposition will be determined by that provider.        ORDERS  Labs Reviewed - No data to display    ED Orders (720h ago, onward)      Start Ordered     Status Ordering Provider    09/05/23 0949 09/05/23 0948  US Lower Extremity Veins Left  1 time imaging         Ordered EDEN PARKER    09/05/23 0948 09/05/23 0948  Saline lock IV  Once         Ordered EDEN PARKER    09/05/23 0948 09/05/23 0948  CBC auto differential  STAT         Ordered EDEN PARKER    09/05/23 0948 09/05/23 0948  Comprehensive metabolic panel  STAT         Ordered " EDEN PARKER              Virtual Visit Note: The provider triage portion of this emergency department evaluation and documentation was performed via [employer], a HIPAA-compliant telemedicine application, in concert with a tele-presenter in the room. A face to face patient evaluation with one of my colleagues will occur once the patient is placed in an emergency department room.      DISCLAIMER: This note was prepared with [employer] voice recognition transcription software. Garbled syntax, mangled pronouns, and other bizarre constructions may be attributed to that software system.

## 2023-09-05 NOTE — Clinical Note
The site was marked. The site was prepped with ChloraPrep. The site was clipped. The patient was draped. Bilateral popliteal sites prepped per protocol

## 2023-09-05 NOTE — ASSESSMENT & PLAN NOTE
Left lower extremity swelling and erythema.  US reports nearly occlusive thrombus involving the left femoral vein, popliteal vein, posterior tibial and peroneal veins.  Interventional Radiology to perform a thrombectomy on Wednesday. Heparin drip started at this time for anticoagulation. Evaluating for acquired vs inherited etiology. Patient's father has had a DVT in the past. Patient denies trauma, immobilization.  CT abdomen pelvis and CT non-coronary ordered. Will refer to hematology outpatient.

## 2023-09-05 NOTE — ED TRIAGE NOTES
Pt arrived with LLE pain and swelling x 1 week.  Pt reports swelling extends from all of the calf to the foot.  Pt denies any redness or warmth to calf, denies hx of blood clots.  Denies any recent falls or trauma.  Pt applied biofreeze with no relief.  Reports tingling only when laying on L leg.  Pt answering questions appropriately, speaking in complete sentences, respirations even and unlabored.  Aao x 4.

## 2023-09-06 VITALS
DIASTOLIC BLOOD PRESSURE: 71 MMHG | RESPIRATION RATE: 16 BRPM | HEART RATE: 78 BPM | WEIGHT: 259.94 LBS | TEMPERATURE: 98 F | SYSTOLIC BLOOD PRESSURE: 121 MMHG | OXYGEN SATURATION: 100 % | HEIGHT: 74 IN | BODY MASS INDEX: 33.36 KG/M2

## 2023-09-06 DIAGNOSIS — D50.9 IRON DEFICIENCY ANEMIA, UNSPECIFIED IRON DEFICIENCY ANEMIA TYPE: Primary | ICD-10-CM

## 2023-09-06 LAB
ANION GAP SERPL CALC-SCNC: 10 MMOL/L (ref 8–16)
APTT PPP: 121 SEC (ref 21–32)
APTT PPP: 73.2 SEC (ref 21–32)
APTT PPP: 85.1 SEC (ref 21–32)
APTT PPP: 93.2 SEC (ref 21–32)
BASOPHILS # BLD AUTO: 0.05 K/UL (ref 0–0.2)
BASOPHILS # BLD AUTO: 0.06 K/UL (ref 0–0.2)
BASOPHILS NFR BLD: 0.7 % (ref 0–1.9)
BASOPHILS NFR BLD: 0.7 % (ref 0–1.9)
BUN SERPL-MCNC: 9 MG/DL (ref 6–20)
CALCIUM SERPL-MCNC: 9 MG/DL (ref 8.7–10.5)
CHLORIDE SERPL-SCNC: 108 MMOL/L (ref 95–110)
CO2 SERPL-SCNC: 22 MMOL/L (ref 23–29)
CREAT SERPL-MCNC: 1 MG/DL (ref 0.5–1.4)
DIFFERENTIAL METHOD: ABNORMAL
DIFFERENTIAL METHOD: ABNORMAL
EOSINOPHIL # BLD AUTO: 0.2 K/UL (ref 0–0.5)
EOSINOPHIL # BLD AUTO: 0.3 K/UL (ref 0–0.5)
EOSINOPHIL NFR BLD: 3 % (ref 0–8)
EOSINOPHIL NFR BLD: 3.3 % (ref 0–8)
ERYTHROCYTE [DISTWIDTH] IN BLOOD BY AUTOMATED COUNT: 22.2 % (ref 11.5–14.5)
ERYTHROCYTE [DISTWIDTH] IN BLOOD BY AUTOMATED COUNT: 22.7 % (ref 11.5–14.5)
ERYTHROCYTE [DISTWIDTH] IN BLOOD BY AUTOMATED COUNT: 23 % (ref 11.5–14.5)
EST. GFR  (NO RACE VARIABLE): >60 ML/MIN/1.73 M^2
GLUCOSE SERPL-MCNC: 103 MG/DL (ref 70–110)
HCT VFR BLD AUTO: 26.6 % (ref 40–54)
HCT VFR BLD AUTO: 29 % (ref 40–54)
HCT VFR BLD AUTO: 29.4 % (ref 40–54)
HGB BLD-MCNC: 7.1 G/DL (ref 14–18)
HGB BLD-MCNC: 7.6 G/DL (ref 14–18)
HGB BLD-MCNC: 7.8 G/DL (ref 14–18)
IMM GRANULOCYTES # BLD AUTO: 0.03 K/UL (ref 0–0.04)
IMM GRANULOCYTES # BLD AUTO: 0.05 K/UL (ref 0–0.04)
IMM GRANULOCYTES NFR BLD AUTO: 0.4 % (ref 0–0.5)
IMM GRANULOCYTES NFR BLD AUTO: 0.7 % (ref 0–0.5)
LYMPHOCYTES # BLD AUTO: 2.1 K/UL (ref 1–4.8)
LYMPHOCYTES # BLD AUTO: 3.2 K/UL (ref 1–4.8)
LYMPHOCYTES NFR BLD: 29.1 % (ref 18–48)
LYMPHOCYTES NFR BLD: 38.9 % (ref 18–48)
MCH RBC QN AUTO: 17.3 PG (ref 27–31)
MCH RBC QN AUTO: 17.3 PG (ref 27–31)
MCH RBC QN AUTO: 17.4 PG (ref 27–31)
MCHC RBC AUTO-ENTMCNC: 26.2 G/DL (ref 32–36)
MCHC RBC AUTO-ENTMCNC: 26.5 G/DL (ref 32–36)
MCHC RBC AUTO-ENTMCNC: 26.7 G/DL (ref 32–36)
MCV RBC AUTO: 65 FL (ref 82–98)
MCV RBC AUTO: 66 FL (ref 82–98)
MCV RBC AUTO: 66 FL (ref 82–98)
MONOCYTES # BLD AUTO: 0.6 K/UL (ref 0.3–1)
MONOCYTES # BLD AUTO: 0.6 K/UL (ref 0.3–1)
MONOCYTES NFR BLD: 7.7 % (ref 4–15)
MONOCYTES NFR BLD: 8.2 % (ref 4–15)
NEUTROPHILS # BLD AUTO: 4 K/UL (ref 1.8–7.7)
NEUTROPHILS # BLD AUTO: 4.1 K/UL (ref 1.8–7.7)
NEUTROPHILS NFR BLD: 49 % (ref 38–73)
NEUTROPHILS NFR BLD: 58.3 % (ref 38–73)
NRBC BLD-RTO: 0 /100 WBC
NRBC BLD-RTO: 0 /100 WBC
PATH REV BLD -IMP: NORMAL
PATH REV BLD -IMP: NORMAL
PLATELET # BLD AUTO: 330 K/UL (ref 150–450)
PLATELET # BLD AUTO: 339 K/UL (ref 150–450)
PLATELET # BLD AUTO: 387 K/UL (ref 150–450)
PMV BLD AUTO: 8.4 FL (ref 9.2–12.9)
PMV BLD AUTO: 8.7 FL (ref 9.2–12.9)
PMV BLD AUTO: 8.8 FL (ref 9.2–12.9)
POC ACTIVATED CLOTTING TIME K: 143 SEC (ref 74–137)
POC ACTIVATED CLOTTING TIME K: 161 SEC (ref 74–137)
POTASSIUM SERPL-SCNC: 4 MMOL/L (ref 3.5–5.1)
RBC # BLD AUTO: 4.11 M/UL (ref 4.6–6.2)
RBC # BLD AUTO: 4.4 M/UL (ref 4.6–6.2)
RBC # BLD AUTO: 4.47 M/UL (ref 4.6–6.2)
SAMPLE: ABNORMAL
SAMPLE: ABNORMAL
SODIUM SERPL-SCNC: 140 MMOL/L (ref 136–145)
WBC # BLD AUTO: 7.07 K/UL (ref 3.9–12.7)
WBC # BLD AUTO: 8.15 K/UL (ref 3.9–12.7)
WBC # BLD AUTO: 8.48 K/UL (ref 3.9–12.7)

## 2023-09-06 PROCEDURE — 99204 OFFICE O/P NEW MOD 45 MIN: CPT | Mod: 25,,, | Performed by: STUDENT IN AN ORGANIZED HEALTH CARE EDUCATION/TRAINING PROGRAM

## 2023-09-06 PROCEDURE — 85025 COMPLETE CBC W/AUTO DIFF WBC: CPT | Mod: 91 | Performed by: NURSE PRACTITIONER

## 2023-09-06 PROCEDURE — 99223 PR INITIAL HOSPITAL CARE,LEVL III: ICD-10-PCS | Mod: ,,, | Performed by: STUDENT IN AN ORGANIZED HEALTH CARE EDUCATION/TRAINING PROGRAM

## 2023-09-06 PROCEDURE — 85060 PATHOLOGIST REVIEW: ICD-10-PCS | Mod: ,,, | Performed by: PATHOLOGY

## 2023-09-06 PROCEDURE — 63600175 PHARM REV CODE 636 W HCPCS: Performed by: RADIOLOGY

## 2023-09-06 PROCEDURE — 36415 COLL VENOUS BLD VENIPUNCTURE: CPT | Performed by: NURSE PRACTITIONER

## 2023-09-06 PROCEDURE — 63600175 PHARM REV CODE 636 W HCPCS: Performed by: PHYSICIAN ASSISTANT

## 2023-09-06 PROCEDURE — 85347 COAGULATION TIME ACTIVATED: CPT | Performed by: RADIOLOGY

## 2023-09-06 PROCEDURE — 86146 BETA-2 GLYCOPROTEIN ANTIBODY: CPT | Mod: 59 | Performed by: NURSE PRACTITIONER

## 2023-09-06 PROCEDURE — 85025 COMPLETE CBC W/AUTO DIFF WBC: CPT | Performed by: PHYSICIAN ASSISTANT

## 2023-09-06 PROCEDURE — G0378 HOSPITAL OBSERVATION PER HR: HCPCS

## 2023-09-06 PROCEDURE — 80048 BASIC METABOLIC PNL TOTAL CA: CPT | Performed by: NURSE PRACTITIONER

## 2023-09-06 PROCEDURE — 25500020 PHARM REV CODE 255: Performed by: RADIOLOGY

## 2023-09-06 PROCEDURE — C1757 CATH, THROMBECTOMY/EMBOLECT: HCPCS | Performed by: RADIOLOGY

## 2023-09-06 PROCEDURE — 25000003 PHARM REV CODE 250: Performed by: RADIOLOGY

## 2023-09-06 PROCEDURE — C1769 GUIDE WIRE: HCPCS | Performed by: RADIOLOGY

## 2023-09-06 PROCEDURE — 36415 COLL VENOUS BLD VENIPUNCTURE: CPT | Performed by: PHYSICIAN ASSISTANT

## 2023-09-06 PROCEDURE — 85730 THROMBOPLASTIN TIME PARTIAL: CPT | Mod: 91 | Performed by: HOSPITALIST

## 2023-09-06 PROCEDURE — 99239 HOSP IP/OBS DSCHRG MGMT >30: CPT | Mod: ,,, | Performed by: NURSE PRACTITIONER

## 2023-09-06 PROCEDURE — 96375 TX/PRO/DX INJ NEW DRUG ADDON: CPT | Mod: 59

## 2023-09-06 PROCEDURE — 63600175 PHARM REV CODE 636 W HCPCS: Performed by: NURSE PRACTITIONER

## 2023-09-06 PROCEDURE — 85027 COMPLETE CBC AUTOMATED: CPT | Performed by: NURSE PRACTITIONER

## 2023-09-06 PROCEDURE — C1894 INTRO/SHEATH, NON-LASER: HCPCS | Performed by: RADIOLOGY

## 2023-09-06 PROCEDURE — 86147 CARDIOLIPIN ANTIBODY EA IG: CPT | Performed by: NURSE PRACTITIONER

## 2023-09-06 PROCEDURE — 25000003 PHARM REV CODE 250: Performed by: NURSE PRACTITIONER

## 2023-09-06 PROCEDURE — 85730 THROMBOPLASTIN TIME PARTIAL: CPT | Performed by: PHYSICIAN ASSISTANT

## 2023-09-06 PROCEDURE — C1887 CATHETER, GUIDING: HCPCS | Performed by: RADIOLOGY

## 2023-09-06 PROCEDURE — 99152 MOD SED SAME PHYS/QHP 5/>YRS: CPT | Performed by: RADIOLOGY

## 2023-09-06 PROCEDURE — 81241 F5 GENE: CPT | Performed by: NURSE PRACTITIONER

## 2023-09-06 PROCEDURE — 85730 THROMBOPLASTIN TIME PARTIAL: CPT | Mod: 91 | Performed by: NURSE PRACTITIONER

## 2023-09-06 PROCEDURE — 99153 MOD SED SAME PHYS/QHP EA: CPT | Performed by: RADIOLOGY

## 2023-09-06 PROCEDURE — 81240 F2 GENE: CPT | Performed by: NURSE PRACTITIONER

## 2023-09-06 PROCEDURE — 96366 THER/PROPH/DIAG IV INF ADDON: CPT

## 2023-09-06 PROCEDURE — 99239 PR HOSPITAL DISCHARGE DAY,>30 MIN: ICD-10-PCS | Mod: ,,, | Performed by: NURSE PRACTITIONER

## 2023-09-06 PROCEDURE — 85060 BLOOD SMEAR INTERPRETATION: CPT | Mod: ,,, | Performed by: PATHOLOGY

## 2023-09-06 PROCEDURE — 99204 PR OFFICE/OUTPT VISIT, NEW, LEVL IV, 45-59 MIN: ICD-10-PCS | Mod: 25,,, | Performed by: STUDENT IN AN ORGANIZED HEALTH CARE EDUCATION/TRAINING PROGRAM

## 2023-09-06 PROCEDURE — 99223 1ST HOSP IP/OBS HIGH 75: CPT | Mod: ,,, | Performed by: STUDENT IN AN ORGANIZED HEALTH CARE EDUCATION/TRAINING PROGRAM

## 2023-09-06 RX ORDER — FERROUS SULFATE 325(65) MG
325 TABLET ORAL DAILY
Qty: 30 TABLET | Refills: 0 | Status: SHIPPED | OUTPATIENT
Start: 2023-09-06

## 2023-09-06 RX ORDER — SODIUM CHLORIDE 0.9 % (FLUSH) 0.9 %
10 SYRINGE (ML) INJECTION
OUTPATIENT
Start: 2023-09-06

## 2023-09-06 RX ORDER — EPINEPHRINE 0.3 MG/.3ML
0.3 INJECTION SUBCUTANEOUS ONCE AS NEEDED
OUTPATIENT
Start: 2023-09-06

## 2023-09-06 RX ORDER — HEPARIN 100 UNIT/ML
500 SYRINGE INTRAVENOUS
OUTPATIENT
Start: 2023-09-06

## 2023-09-06 RX ORDER — MIDAZOLAM HYDROCHLORIDE 1 MG/ML
INJECTION, SOLUTION INTRAMUSCULAR; INTRAVENOUS
Status: DISCONTINUED | OUTPATIENT
Start: 2023-09-06 | End: 2023-09-06 | Stop reason: HOSPADM

## 2023-09-06 RX ORDER — FERROUS SULFATE 325(65) MG
325 TABLET ORAL DAILY
Qty: 30 TABLET | Refills: 0 | Status: SHIPPED | OUTPATIENT
Start: 2023-09-06 | End: 2023-09-06 | Stop reason: SDUPTHER

## 2023-09-06 RX ORDER — DOCUSATE SODIUM 100 MG/1
100 CAPSULE, LIQUID FILLED ORAL DAILY
Qty: 30 CAPSULE | Refills: 0 | Status: SHIPPED | OUTPATIENT
Start: 2023-09-06 | End: 2023-09-06 | Stop reason: SDUPTHER

## 2023-09-06 RX ORDER — DIPHENHYDRAMINE HYDROCHLORIDE 50 MG/ML
50 INJECTION INTRAMUSCULAR; INTRAVENOUS ONCE AS NEEDED
OUTPATIENT
Start: 2023-09-06

## 2023-09-06 RX ORDER — HEPARIN SODIUM 1000 [USP'U]/ML
INJECTION, SOLUTION INTRAVENOUS; SUBCUTANEOUS
Status: DISCONTINUED | OUTPATIENT
Start: 2023-09-06 | End: 2023-09-06 | Stop reason: HOSPADM

## 2023-09-06 RX ORDER — DOCUSATE SODIUM 100 MG/1
100 CAPSULE, LIQUID FILLED ORAL DAILY
Qty: 30 CAPSULE | Refills: 0 | Status: SHIPPED | OUTPATIENT
Start: 2023-09-06

## 2023-09-06 RX ORDER — FENTANYL CITRATE 50 UG/ML
INJECTION, SOLUTION INTRAMUSCULAR; INTRAVENOUS
Status: DISCONTINUED | OUTPATIENT
Start: 2023-09-06 | End: 2023-09-06 | Stop reason: HOSPADM

## 2023-09-06 RX ORDER — LIDOCAINE HYDROCHLORIDE 10 MG/ML
INJECTION, SOLUTION EPIDURAL; INFILTRATION; INTRACAUDAL; PERINEURAL
Status: DISCONTINUED | OUTPATIENT
Start: 2023-09-06 | End: 2023-09-06 | Stop reason: HOSPADM

## 2023-09-06 RX ADMIN — HEPARIN SODIUM 14 UNITS/KG/HR: 10000 INJECTION, SOLUTION INTRAVENOUS at 11:09

## 2023-09-06 RX ADMIN — APIXABAN 10 MG: 2.5 TABLET, FILM COATED ORAL at 05:09

## 2023-09-06 RX ADMIN — IRON SUCROSE 300 MG: 20 INJECTION, SOLUTION INTRAVENOUS at 06:09

## 2023-09-06 NOTE — ASSESSMENT & PLAN NOTE
S/p thrombectomy on LLE   Ok to be discharged on eliquis   hypercoag work up will be followed in clinic OP

## 2023-09-06 NOTE — PLAN OF CARE
MSW met with the patient at the bedside.     Patient is alert and oriented with no communication barriers.     Prior to admission, the patient is independent. Patient denies the use of HH or DME.     Patients PCP is correct on the face sheet. Patient choice pharmacy is [facility]/bedside.    The patient denies having written advance directives.      Patients' family will transport the patient home at discharge.     No CM needs to be identified at this time.     CM team will continue to follow.      09/06/23 0906   Discharge Assessment   Assessment Type Discharge Planning Assessment   Confirmed/corrected address, phone number and insurance Yes   Confirmed Demographics Correct on Facesheet   Source of Information patient;health record   People in Home child(tone), dependent;significant other   Do you expect to return to your current living situation? Yes   Do you have help at home or someone to help you manage your care at home? Yes   Prior to hospitilization cognitive status: Alert/Oriented   Current cognitive status: Alert/Oriented   Equipment Currently Used at Home none   Readmission within 30 days? No   Patient currently being followed by outpatient case management? No   Do you currently have service(s) that help you manage your care at home? No   Do you take prescription medications? Yes   Do you have prescription coverage? Yes   Do you have any problems affording any of your prescribed medications? No   Is the patient taking medications as prescribed? yes   How do you get to doctors appointments? car, drives self;family or friend will provide   Are you on dialysis? No   Do you take coumadin? No   DME Needed Upon Discharge  none   Discharge Plan discussed with: Patient   Transition of Care Barriers None   Discharge Plan A Home with family

## 2023-09-06 NOTE — ASSESSMENT & PLAN NOTE
Left lower extremity swelling and erythema improved following thrombectomy.  US reports nearly occlusive thrombus involving the left femoral vein, popliteal vein, posterior tibial and peroneal veins.  Interventional Radiology to performed a thrombectomy and removed clots. Heparin drip stopped and apixaban started. Hematology evaluating for acquired vs inherited etiology. Patient's father has had a DVT in the past. Patient denies trauma, immobilization.  CT abdomen pelvis negative for malignancy and CT non-coronary revealed BL pulmonary emboli. Patient to f/u with Hematology outpatient. IR recs AC, compression socks, and hypercoagulable workup. Each rec ordered.  They will remove suture before patient leaves this evening.

## 2023-09-06 NOTE — PLAN OF CARE
3:22 PM  Patient rcvd to room s handoff for Heparin gtt or post procedure      Providers requested to bedside for patient to be able to leave today.    5:11 PM  PACU arrived at bedside for handoff.    Heparin Gtt dc'd    Form to be signed for employeer, provider updated

## 2023-09-06 NOTE — DISCHARGE SUMMARY
[facility] Surg [address] Medicine  Discharge Summary      Patient Name: Andi Min  MRN: [id number]  [facility]: [id number]  Patient Class: OP- Observation  Admission Date: 9/5/2023  Hospital Length of Stay: 1 days  Discharge Date and Time:  09/06/2023 6:12 PM  Attending Physician: Buster Branch MD   Discharging Provider: Alysha Morse DNP  Primary Care Provider: Jackie, Primary Doctor    Primary Care Team: Networked reference to record PCT     HPI:   Mr Escamilla is a 38 year old male with a previous medical history of left torn meniscus, a superficial venous thrombosis 2 years ago, and he does not take any medications. Mr Escamilla came to the ED for pain and swelling to his left lower leg. It began about one week ago. He denies any trauma, prolonged immobilization, or other injury to his left leg. The pain and swelling does affect his mobility. The pain is worse with movement. He did take an extended car ride over the weekend to [place] this weekend but he already had the swelling and pain.  Patient denies back pain, decreased sensation, and denies shortness of breath.     In the emergency room, Mr Escamilla had an ultrasound of his lower extremities which revealed a nearly occlusive thrombosis involving the left femoral vein, popliteal vein, and posterior tibial and peroneal veins. Interventional radiology was contact by ED provider. Patient to be started on heparin drip and have a thrombectomy tomorrow. Plan of care discussed with patient and his wife who is a bedside. Patient admitted to hospital medicine for further management.       Procedure(s) (LRB):  Venogram (N/A)      Hospital Course:   Mr Escamilla was admitted for left lower extremity pain and swelling. Venous thrombosis found in multiple sites on US. CT chest also showed BL pulmonary emboli. Patient was started on heparin drip and underwent thrombectomy to remove clots. His pain and swelling improved to the left lower leg. He received  iron infusion also.  Patient to be discharged on oral anticoagulation. Discussed implications of medication. He was also seen by Dr Harrington with Hematology for his blood clots and chronic/significant NARENDRA. She throroughly discussed his diagnosis, implications, medications, and follow up. Mr Escamilla will follow up with GI, Hem, and PCP outpatient. He would like to be discharged this evening following IV iron administration and repeat labs. Wife at bedside.        Goals of Care Treatment Preferences:         Consults:   Consults (From admission, onward)        Status Ordering Provider     Inpatient consult to Hematology/Oncology  Once        Provider:  Geneva Harrington MD    Completed KAPIL PURI     Inpatient consult to Interventional Radiology  Once        Provider:  (Not yet assigned)    Completed KAPIL PURI          Oncology  Iron deficiency anemia  Hgb 7.4, Hct 27.9, Plts 345, MCV 66  Patient denies blood loss, dark stool  -Check occult   -Iron/TIBC/Transferrin/LDH/Retics indicate NARENDRA  - 300 mg venofer ordered followed by oral supplementation  - constipation discussed  - Pt to follow up with GI outpatient for urgent eval of NARENDRA        Other  * Acute deep vein thrombosis (DVT) of left lower extremity  Left lower extremity swelling and erythema improved following thrombectomy.  US reports nearly occlusive thrombus involving the left femoral vein, popliteal vein, posterior tibial and peroneal veins.  Interventional Radiology to performed a thrombectomy and removed clots. Heparin drip stopped and apixaban started. Hematology evaluating for acquired vs inherited etiology. Patient's father has had a DVT in the past. Patient denies trauma, immobilization.  CT abdomen pelvis negative for malignancy and CT non-coronary revealed BL pulmonary emboli. Patient to f/u with Hematology outpatient. IR recs AC, compression socks, and hypercoagulable workup. Each rec ordered.  They will remove suture before patient  leaves this evening.          Final Active Diagnoses:    Diagnosis Date Noted POA    PRINCIPAL PROBLEM:  Acute deep vein thrombosis (DVT) of left lower extremity [I82.402] 09/05/2023 Yes    Iron deficiency anemia [D50.9] 09/05/2023 Yes    Bilateral pulmonary embolism [I26.99] 09/05/2023 Yes      Problems Resolved During this Admission:       Discharged Condition: good    Disposition: Home or Self Care    Follow Up:   Follow-up Information     Geneva Harrington MD Follow up.    Specialty: Hematology and Oncology  Contact information:  [address] [address]  [phone]                       Patient Instructions:      Ambulatory referral/consult to Internal Medicine   Standing Status: Future   Referral Priority: Routine Referral Type: Consultation   Referral Reason: Specialty Services Required   Requested Specialty: Internal Medicine   Number of Visits Requested: 1     Ambulatory referral/consult to Hematology / Oncology   Standing Status: Future   Referral Priority: Routine Referral Type: Consultation   Referral Reason: Specialty Services Required   Requested Specialty: Hematology and Oncology   Number of Visits Requested: 1     Ambulatory referral/consult to Gastroenterology   Standing Status: Future   Referral Priority: Urgent Referral Type: Consultation   Referral Reason: Specialty Services Required   Requested Specialty: Gastroenterology   Number of Visits Requested: 1     Diet Adult Regular     Notify your health care provider if you experience any of the following:  persistent dizziness, light-headedness, or visual disturbances     Notify your health care provider if you experience any of the following:  increased confusion or weakness     Notify your health care provider if you experience any of the following:  difficulty breathing or increased cough     Activity as tolerated       Significant Diagnostic Studies: N/A    Pending Diagnostic Studies:     Procedure Component Value  Units Date/Time    Beta-2 glycoprotein antibodies [[id number]] Collected: 09/06/23 0746    Order Status: Sent Lab Status: In process Updated: 09/06/23 1048    Specimen: Blood     Cardiolipin antibody [[id number]] Collected: 09/06/23 0746    Order Status: Sent Lab Status: In process Updated: 09/06/23 1048    Specimen: Blood     Factor 5 leiden [[id number]] Collected: 09/06/23 0746    Order Status: Sent Lab Status: In process Updated: 09/06/23 1115    Specimen: Blood     IR Venogram Lower Extremity Left [[id number]]     Order Status: Sent Lab Status: No result     Prothrombin gene mutation [[id number]] Collected: 09/06/23 0746    Order Status: Sent Lab Status: In process Updated: 09/06/23 1115    Specimen: Blood          Medications:  Reconciled Home Medications:      Medication List      START taking these medications    * apixaban 5 mg Tab  Commonly known as: ELIQUIS  Take 2 tablets (10 mg total) by mouth 2 (two) times daily. (7 days total. First dose was given in the hospital). for 13 doses  Start taking on: September 7, 2023     * apixaban 5 mg Tab  Commonly known as: ELIQUIS  Take 1 tablet (5 mg total) by mouth 2 (two) times daily. Start on September 13th. Take the 10 mg in the morning and start the 5 mg in the evening.  Start taking on: September 13, 2023     docusate sodium 100 MG capsule  Commonly known as: COLACE  Take 1 capsule (100 mg total) by mouth once daily. For constipation     ferrous sulfate 325 mg (65 mg iron) Tab tablet  Commonly known as: IRON  Take 1 tablet (325 mg total) by mouth once daily.         * This list has 2 medication(s) that are the same as other medications prescribed for you. Read the directions carefully, and ask your doctor or other care provider to review them with you.                Indwelling Lines/Drains at time of discharge:   Lines/Drains/Airways     None                 Time spent on the discharge of patient: 65 minutes         Alysha Morse DNP  Department of  [facility] - Med Surg ([address])

## 2023-09-06 NOTE — PROCEDURE NOTE ADDENDUM
Radiology Post-Procedure Note    Pre Op Diagnosis: Acute LLE DVT  Post Op Diagnosis: Same    Procedure: LLE venography, mechanical thrombectomy    Procedure performed by: Bernie Valerio MD    Written Informed Consent Obtained: Yes  Specimen Removed: YES, thrombus  Estimated Blood Loss: less than 100     Findings:   Non-compressible left popliteal vein. Compressible right popliteal vein.  US-guided access of the bilateral popliteal veins with placement of 13F Inari ClotTriever sheaths bilaterally.  Initial venography demonstrates near-occlusive thrombus involving the left femoral and popliteal veins. No iliocaval extension.  Flowtriever protective discs advanced via right popliteal access into the IVC to prevent pulmonary emboli during case. Removed at cessation of case.  ClotTriever mechanical thrombectomy of left femoral and popliteal veins with removal of several large clots.  Post-thrombectomy venography demonstrates no residual thrombus or stenosis within the left femoral and popliteal venous segments.    Patient tolerated procedure well.    Plan:  - Continue AC, recommend transition to oral AC at discharge  - Recommend completion of hypercoagulability work-up  - Recommend compression stockings    Bernie Valerio MD

## 2023-09-06 NOTE — ASSESSMENT & PLAN NOTE
Hgb 7.4, Hct 27.9, Plts 345, MCV 66  Patient denies blood loss, dark stool  -Check occult   -Iron/TIBC/Transferrin/LDH/Retics indicate NARENDRA  - 300 mg venofer ordered followed by oral supplementation  - constipation discussed  - Pt to follow up with GI outpatient for urgent eval of NARENDRA

## 2023-09-06 NOTE — PLAN OF CARE
Problem: Adult Inpatient Plan of Care  Goal: Plan of Care Review  Outcome: Ongoing, Progressing  Goal: Patient-Specific Goal (Individualized)  Outcome: Ongoing, Progressing  Goal: Absence of Hospital-Acquired Illness or Injury  Outcome: Ongoing, Progressing  Goal: Optimal Comfort and Wellbeing  Outcome: Ongoing, Progressing  Goal: Readiness for Transition of Care  Outcome: Ongoing, Progressing     Problem: VTE (Venous Thromboembolism)  Goal: VTE (Venous Thromboembolism) Symptom Resolution  Outcome: Ongoing, Progressing     Problem: Fall Injury Risk  Goal: Absence of Fall and Fall-Related Injury  Outcome: Ongoing, Progressing

## 2023-09-06 NOTE — CONSULTS
[facility] Surg ([address])  Hematology/Oncology  Consult Note    Patient Name: Andi Min  MRN: [id number]  Admission Date: 9/5/2023  Hospital Length of Stay: 0 days  Code Status: No Order   Attending Provider: Buster Branch MD  Consulting Provider: Geneva Harrington MD  Primary Care Physician: Jackie, Primary Doctor  Principal Problem:Acute deep vein thrombosis (DVT) of left lower extremity    Inpatient consult to Hematology/Oncology  Consult performed by: Geneva Harrington MD  Consult ordered by: Alysha Morse DNP        Subjective:     HPI:  39 yo M is admitted with DVT / PE.   US LLE DVT and small emboli in the bilateral lower lobe pulmonary arteries on CTA.   CT A/P- no concerning masses. He underwent thrombectomy with IR on 9/6/23.     He reports trip to [place] which was about 4 hrs or so over the weekend but had symptoms of leg swelling/ discomfort even prior to trip.   He is  and physically active but at times does drive 12 hr or so continuously.  No injuries or smoking or recent hospitalizations.   He denied any weight loss , abd pain, sob, chest pain.    His father had DVT/PE after surgery. No other positive Fhx. No Fhx of cancers.     Labs also show severe NARENDRA.   He denied any bleeding issues. No blood in stool or melena, change in bowel habits. No excessive use of NSAIDs. No Fhx of colon cancer.           Oncology Treatment Plan:   [No matching plan found]    Medications:  Continuous Infusions:  Scheduled Meds:   apixaban  10 mg Oral BID    IRON SUCROSE IV ORDERABLE  300 mg Intravenous Once     PRN Meds:acetaminophen, HYDROcodone-acetaminophen     Review of patient's allergies indicates:  No Known Allergies     History reviewed. No pertinent past medical history.  History reviewed. No pertinent surgical history.  Family History       Problem Relation (Age of Onset)    Diabetes Mother    Hypertension Father          Tobacco Use    Smoking status: Every Day     Types:  Vaping with nicotine    Smokeless tobacco: Never   Substance and Sexual Activity    Alcohol use: Yes     Comment: socially    Drug use: No    Sexual activity: Yes     Partners: Female       Review of Systems   Constitutional:  Negative for activity change, appetite change, chills, diaphoresis, fatigue, fever and unexpected weight change.   HENT:  Negative for congestion, dental problem, hearing loss, mouth sores, nosebleeds, postnasal drip, rhinorrhea, sore throat, tinnitus, trouble swallowing and voice change.    Eyes:  Negative for redness and visual disturbance.   Respiratory:  Negative for cough, shortness of breath and wheezing.    Cardiovascular:  Positive for leg swelling. Negative for chest pain and palpitations.   Gastrointestinal:  Negative for abdominal distention, abdominal pain, blood in stool, constipation, diarrhea, nausea and vomiting.   Endocrine: Negative for heat intolerance.   Genitourinary:  Negative for difficulty urinating, dysuria, frequency, hematuria, scrotal swelling, testicular pain and urgency.   Musculoskeletal:  Negative for arthralgias, back pain, gait problem, myalgias and neck pain.   Skin:  Negative for color change, pallor, rash and wound.   Allergic/Immunologic: Negative for immunocompromised state.   Neurological:  Negative for dizziness, seizures, syncope, speech difficulty, weakness, numbness and headaches.   Psychiatric/Behavioral:  Negative for confusion, dysphoric mood, sleep disturbance and suicidal ideas. The patient is not nervous/anxious.      Objective:     Vital Signs (Most Recent):  Temp: 98.3 °F (36.8 °C) (09/06/23 1430)  Pulse: 70 (09/06/23 1556)  Resp: 16 (09/06/23 1415)  BP: 121/71 (09/06/23 1430)  SpO2: 100 % (09/06/23 1430) Vital Signs (24h Range):  Temp:  [98.1 °F (36.7 °C)-99.1 °F (37.3 °C)] 98.3 °F (36.8 °C)  Pulse:  [66-86] 70  Resp:  [16-18] 16  SpO2:  [96 %-100 %] 100 %  BP: (110-142)/(61-77) 121/71     Weight: 117.9 kg (259 lb 14.8 oz)  Body mass  index is 33.37 kg/m².  Body surface area is 2.48 meters squared.      Intake/Output Summary (Last 24 hours) at 9/6/2023 1633  Last data filed at 9/6/2023 1621  Gross per 24 hour   Intake 465.05 ml   Output 400 ml   Net 65.05 ml        Physical Exam  Vitals and nursing note reviewed.   Constitutional:       General: He is not in acute distress.     Appearance: Normal appearance. He is not ill-appearing.   HENT:      Head: Normocephalic and atraumatic.      Right Ear: External ear normal.      Left Ear: External ear normal.      Mouth/Throat:      Pharynx: No oropharyngeal exudate.   Eyes:      General: No scleral icterus.        Right eye: No discharge.         Left eye: No discharge.   Cardiovascular:      Rate and Rhythm: Normal rate.   Pulmonary:      Effort: Pulmonary effort is normal. No respiratory distress.   Abdominal:      General: There is no distension.   Musculoskeletal:         General: No swelling or deformity.      Cervical back: Normal range of motion and neck supple.      Right lower leg: No edema.      Left lower leg: Edema present.   Skin:     Coloration: Skin is not jaundiced.      Findings: No bruising, erythema, lesion or rash.   Neurological:      General: No focal deficit present.      Mental Status: He is alert and oriented to person, place, and time. Mental status is at baseline.      Coordination: Coordination normal.      Gait: Gait normal.   Psychiatric:         Mood and Affect: Mood normal.         Behavior: Behavior normal.          Significant Labs:   All pertinent labs from the last 24 hours have been reviewed.    Diagnostic Results:  CT: reviewed   U/S: reviewed  I have reviewed all pertinent imaging results/findings within the past 24 hours.    Assessment/Plan:     * Acute deep vein thrombosis (DVT) of left lower extremity  S/p thrombectomy on LLE   Ok to be discharged on eliquis   hypercoag work up will be followed in clinic OP       Iron deficiency anemia  venofer 1 dose  inpatient. Rest infusions outpatient.   Needs urgent gi appt outpatient.         Thank you for your consult. I will sign off. Please contact us if you have any additional questions.    Geneva Harrington MD  Hematology/Oncology  [facility] - Med Surg ([address])

## 2023-09-06 NOTE — HOSPITAL COURSE
Mr Escamilla was admitted for left lower extremity pain and swelling. Venous thrombosis found in multiple sites on US. CT chest also showed BL pulmonary emboli. Patient was started on heparin drip and underwent thrombectomy to remove clots. His pain and swelling improved to the left lower leg. He received iron infusion also.  Patient to be discharged on oral anticoagulation. Discussed implications of medication. He was also seen by Dr Harrington with Hematology for his blood clots and chronic/significant NARENDRA. She throroughly discussed his diagnosis, implications, medications, and follow up. Mr Escamilla will follow up with GI, Hem, and PCP outpatient. He would like to be discharged this evening following IV iron administration and repeat labs. Wife at bedside.

## 2023-09-06 NOTE — SUBJECTIVE & OBJECTIVE
Oncology Treatment Plan:   [No matching plan found]    Medications:  Continuous Infusions:  Scheduled Meds:   apixaban  10 mg Oral BID    IRON SUCROSE IV ORDERABLE  300 mg Intravenous Once     PRN Meds:acetaminophen, HYDROcodone-acetaminophen     Review of patient's allergies indicates:  No Known Allergies     History reviewed. No pertinent past medical history.  History reviewed. No pertinent surgical history.  Family History       Problem Relation (Age of Onset)    Diabetes Mother    Hypertension Father          Tobacco Use    Smoking status: Every Day     Types: Vaping with nicotine    Smokeless tobacco: Never   Substance and Sexual Activity    Alcohol use: Yes     Comment: socially    Drug use: No    Sexual activity: Yes     Partners: Female       Review of Systems   Constitutional:  Negative for activity change, appetite change, chills, diaphoresis, fatigue, fever and unexpected weight change.   HENT:  Negative for congestion, dental problem, hearing loss, mouth sores, nosebleeds, postnasal drip, rhinorrhea, sore throat, tinnitus, trouble swallowing and voice change.    Eyes:  Negative for redness and visual disturbance.   Respiratory:  Negative for cough, shortness of breath and wheezing.    Cardiovascular:  Positive for leg swelling. Negative for chest pain and palpitations.   Gastrointestinal:  Negative for abdominal distention, abdominal pain, blood in stool, constipation, diarrhea, nausea and vomiting.   Endocrine: Negative for heat intolerance.   Genitourinary:  Negative for difficulty urinating, dysuria, frequency, hematuria, scrotal swelling, testicular pain and urgency.   Musculoskeletal:  Negative for arthralgias, back pain, gait problem, myalgias and neck pain.   Skin:  Negative for color change, pallor, rash and wound.   Allergic/Immunologic: Negative for immunocompromised state.   Neurological:  Negative for dizziness, seizures, syncope, speech difficulty, weakness, numbness and headaches.    Psychiatric/Behavioral:  Negative for confusion, dysphoric mood, sleep disturbance and suicidal ideas. The patient is not nervous/anxious.      Objective:     Vital Signs (Most Recent):  Temp: 98.3 °F (36.8 °C) (09/06/23 1430)  Pulse: 70 (09/06/23 1556)  Resp: 16 (09/06/23 1415)  BP: 121/71 (09/06/23 1430)  SpO2: 100 % (09/06/23 1430) Vital Signs (24h Range):  Temp:  [98.1 °F (36.7 °C)-99.1 °F (37.3 °C)] 98.3 °F (36.8 °C)  Pulse:  [66-86] 70  Resp:  [16-18] 16  SpO2:  [96 %-100 %] 100 %  BP: (110-142)/(61-77) 121/71     Weight: 117.9 kg (259 lb 14.8 oz)  Body mass index is 33.37 kg/m².  Body surface area is 2.48 meters squared.      Intake/Output Summary (Last 24 hours) at 9/6/2023 1633  Last data filed at 9/6/2023 1621  Gross per 24 hour   Intake 465.05 ml   Output 400 ml   Net 65.05 ml        Physical Exam  Vitals and nursing note reviewed.   Constitutional:       General: He is not in acute distress.     Appearance: Normal appearance. He is not ill-appearing.   HENT:      Head: Normocephalic and atraumatic.      Right Ear: External ear normal.      Left Ear: External ear normal.      Mouth/Throat:      Pharynx: No oropharyngeal exudate.   Eyes:      General: No scleral icterus.        Right eye: No discharge.         Left eye: No discharge.   Cardiovascular:      Rate and Rhythm: Normal rate.   Pulmonary:      Effort: Pulmonary effort is normal. No respiratory distress.   Abdominal:      General: There is no distension.   Musculoskeletal:         General: No swelling or deformity.      Cervical back: Normal range of motion and neck supple.      Right lower leg: No edema.      Left lower leg: Edema present.   Skin:     Coloration: Skin is not jaundiced.      Findings: No bruising, erythema, lesion or rash.   Neurological:      General: No focal deficit present.      Mental Status: He is alert and oriented to person, place, and time. Mental status is at baseline.      Coordination: Coordination normal.      Gait:  Gait normal.   Psychiatric:         Mood and Affect: Mood normal.         Behavior: Behavior normal.          Significant Labs:   All pertinent labs from the last 24 hours have been reviewed.    Diagnostic Results:  CT: reviewed   U/S: reviewed  I have reviewed all pertinent imaging results/findings within the past 24 hours.

## 2023-09-07 PROBLEM — M79.89 LEG SWELLING: Status: ACTIVE | Noted: 2023-09-07

## 2023-09-07 RX ORDER — APIXABAN 5 MG (74)
KIT ORAL
Qty: 1 EACH | Refills: 0 | Status: SHIPPED | OUTPATIENT
Start: 2023-09-07 | End: 2023-09-20 | Stop reason: SDUPTHER

## 2023-09-07 NOTE — PLAN OF CARE
09/07/23 0731   Final Note   Assessment Type Final Discharge Note   Anticipated Discharge Disposition Home   Hospital Resources/Appts/Education Provided Provided patient/caregiver with written discharge plan information;Appointments scheduled and added to AVS   Post-Acute Status   Discharge Delays None known at this time       Pt states he lives independently at home.     Family to provide transportation home.    Patient stated he will make his own PCP follow up.     All d/c needs/orders were addressed from a CM perspective.

## 2023-09-11 LAB
B2 GLYCOPROT1 IGA SER QL: 1.3 U/ML
B2 GLYCOPROT1 IGG SER QL: 0.8 U/ML
B2 GLYCOPROT1 IGM SER QL: 3.6 U/ML
CARDIOLIPIN IGG SER IA-ACNC: <9.4 GPL (ref 0–14.99)
CARDIOLIPIN IGM SER IA-ACNC: <9.4 MPL (ref 0–12.49)
F2 C.20210G>A GENO BLD/T: NEGATIVE
F5 P.R506Q BLD/T QL: NEGATIVE

## 2023-09-12 ENCOUNTER — PATIENT OUTREACH (OUTPATIENT)
Dept: ADMINISTRATIVE | Facility: HOSPITAL | Age: 38
End: 2023-09-12
Payer: COMMERCIAL

## 2023-09-20 ENCOUNTER — OFFICE VISIT (OUTPATIENT)
Dept: INTERNAL MEDICINE | Facility: CLINIC | Age: 38
End: 2023-09-20
Payer: COMMERCIAL

## 2023-09-20 VITALS
SYSTOLIC BLOOD PRESSURE: 138 MMHG | BODY MASS INDEX: 35.49 KG/M2 | WEIGHT: 276.44 LBS | OXYGEN SATURATION: 99 % | DIASTOLIC BLOOD PRESSURE: 72 MMHG | HEART RATE: 79 BPM

## 2023-09-20 DIAGNOSIS — Z00.00 ENCOUNTER FOR SCREENING AND PREVENTATIVE CARE: Primary | ICD-10-CM

## 2023-09-20 DIAGNOSIS — E66.9 CLASS 2 OBESITY WITH BODY MASS INDEX (BMI) OF 35.0 TO 35.9 IN ADULT, UNSPECIFIED OBESITY TYPE, UNSPECIFIED WHETHER SERIOUS COMORBIDITY PRESENT: ICD-10-CM

## 2023-09-20 DIAGNOSIS — I82.409 DVT (DEEP VENOUS THROMBOSIS): ICD-10-CM

## 2023-09-20 DIAGNOSIS — D50.9 IRON DEFICIENCY ANEMIA, UNSPECIFIED IRON DEFICIENCY ANEMIA TYPE: ICD-10-CM

## 2023-09-20 PROBLEM — E66.812 CLASS 2 OBESITY WITH BODY MASS INDEX (BMI) OF 35.0 TO 35.9 IN ADULT: Status: ACTIVE | Noted: 2023-09-20

## 2023-09-20 PROCEDURE — 99999 PR PBB SHADOW E&M-EST. PATIENT-LVL III: ICD-10-PCS | Mod: PBBFAC,,, | Performed by: STUDENT IN AN ORGANIZED HEALTH CARE EDUCATION/TRAINING PROGRAM

## 2023-09-20 PROCEDURE — 99386 PREV VISIT NEW AGE 40-64: CPT | Mod: S$GLB,,, | Performed by: STUDENT IN AN ORGANIZED HEALTH CARE EDUCATION/TRAINING PROGRAM

## 2023-09-20 PROCEDURE — 3075F PR MOST RECENT SYSTOLIC BLOOD PRESS GE 130-139MM HG: ICD-10-PCS | Mod: CPTII,S$GLB,, | Performed by: STUDENT IN AN ORGANIZED HEALTH CARE EDUCATION/TRAINING PROGRAM

## 2023-09-20 PROCEDURE — 3078F PR MOST RECENT DIASTOLIC BLOOD PRESSURE < 80 MM HG: ICD-10-PCS | Mod: CPTII,S$GLB,, | Performed by: STUDENT IN AN ORGANIZED HEALTH CARE EDUCATION/TRAINING PROGRAM

## 2023-09-20 PROCEDURE — 1159F PR MEDICATION LIST DOCUMENTED IN MEDICAL RECORD: ICD-10-PCS | Mod: CPTII,S$GLB,, | Performed by: STUDENT IN AN ORGANIZED HEALTH CARE EDUCATION/TRAINING PROGRAM

## 2023-09-20 PROCEDURE — 99386 PR PREVENTIVE VISIT,NEW,40-64: ICD-10-PCS | Mod: S$GLB,,, | Performed by: STUDENT IN AN ORGANIZED HEALTH CARE EDUCATION/TRAINING PROGRAM

## 2023-09-20 PROCEDURE — 99999 PR PBB SHADOW E&M-EST. PATIENT-LVL III: CPT | Mod: PBBFAC,,, | Performed by: STUDENT IN AN ORGANIZED HEALTH CARE EDUCATION/TRAINING PROGRAM

## 2023-09-20 PROCEDURE — 3075F SYST BP GE 130 - 139MM HG: CPT | Mod: CPTII,S$GLB,, | Performed by: STUDENT IN AN ORGANIZED HEALTH CARE EDUCATION/TRAINING PROGRAM

## 2023-09-20 PROCEDURE — 3008F BODY MASS INDEX DOCD: CPT | Mod: CPTII,S$GLB,, | Performed by: STUDENT IN AN ORGANIZED HEALTH CARE EDUCATION/TRAINING PROGRAM

## 2023-09-20 PROCEDURE — 3008F PR BODY MASS INDEX (BMI) DOCUMENTED: ICD-10-PCS | Mod: CPTII,S$GLB,, | Performed by: STUDENT IN AN ORGANIZED HEALTH CARE EDUCATION/TRAINING PROGRAM

## 2023-09-20 PROCEDURE — 3078F DIAST BP <80 MM HG: CPT | Mod: CPTII,S$GLB,, | Performed by: STUDENT IN AN ORGANIZED HEALTH CARE EDUCATION/TRAINING PROGRAM

## 2023-09-20 PROCEDURE — 1159F MED LIST DOCD IN RCRD: CPT | Mod: CPTII,S$GLB,, | Performed by: STUDENT IN AN ORGANIZED HEALTH CARE EDUCATION/TRAINING PROGRAM

## 2023-09-20 NOTE — PROGRESS NOTES
[facility]    Subjective:         Patient ID: Andi Min is a 38 y.o. male.    Chief Complaint: Establish Care    History was obtained from the patient and supplemented through chart review.    HPI:  Patient is a 38 y.o. male who presents establish care and for hospital follow-up.  Patient was recently hospitalized for lower extremity swelling and was found to have an extensive DVT in left lower extremity.  He was also diagnosed bilateral PEs at that time.  He underwent mechanical thrombectomy and was started on anticoagulation.  Patient was also noted to have severe iron-deficiency anemia with hemoglobin of 7.4 on admission.  He was given 1 IV iron infusion and discharged with p.o. iron supplements with referral to GI, Hematology, and Internal Medicine for primary care.  Today his only complaint is residual swelling of the left foot and ankle.  States it is hard for him to put on his boots due to the swelling.     Patient works as a  in [employer].  He has no other medical history that he is aware of there has not regularly seen doctors prior to this.  He denies personal or family history of sickle disease or sickle cell trait.  He denies any GI blood loss or melena.  He would like to try lose weight.  He does have a roughly 15 pack-year smoking history (reports 1 pack would last him 1.5 days.  He smoked for 22 years but quit 4 years ago.     Medical History  No past medical history on file.          Surgical hx, family hx, social hx   Family History   Problem Relation Age of Onset    Diabetes Mother     Hypertension Father      Past Surgical History:   Procedure Laterality Date    PHLEBOGRAPHY N/A 9/6/2023    Procedure: Venogram;  Surgeon: Cortez Zamudio MD;  Location: [facility] CATH LAB;  Service: Interventional Radiology;  Laterality: N/A;     Social History     Socioeconomic History    Marital status:    Occupational History     Employer: [employer]   Tobacco Use    Smoking status: Every Day     Types: Vaping with nicotine    Smokeless tobacco: Never   Substance and Sexual Activity    Alcohol use: Yes     Comment: socially    Drug use: No    Sexual activity: Yes     Partners: Female       Immunization History   Administered Date(s) Administered    COVID-19 Vaccine 03/05/2021, 04/01/2021    COVID-19, MRNA, LN-S, PF (MODERNA FULL 0.5 ML DOSE) 03/05/2021, 04/01/2021, 12/29/2021       Current Outpatient Medications   Medication Instructions    apixaban (ELIQUIS) 5 mg, Oral, 2 times daily    docusate sodium (COLACE) 100 mg, Oral, Daily, For constipation    ferrous sulfate (IRON) 325 mg, Oral, Daily         Objective:        Body mass index is 35.49 kg/m².  Vitals:    09/20/23 1308   BP: 138/72   Pulse: 79   SpO2: 99%   Weight: 125.4 kg (276 lb 7.3 oz)   PainSc: 0-No pain     Physical Exam  Constitutional:       General: He is not in acute distress.     Appearance: Normal appearance.   HENT:      Head: Normocephalic.      Nose: Nose normal.   Eyes:      Extraocular Movements: Extraocular movements intact.   Cardiovascular:      Rate and Rhythm: Normal rate and regular rhythm.      Heart sounds: No murmur heard.     No gallop.   Pulmonary:      Effort: Pulmonary effort is normal.      Breath sounds: Normal breath sounds. No wheezing.   Abdominal:      General: Bowel sounds are normal.      Palpations: Abdomen is soft.      Tenderness: There is no abdominal tenderness. There is no guarding.   Musculoskeletal:         General: No swelling or tenderness.      Cervical back: Normal range of motion and neck supple.      Right lower leg: No edema.      Left lower leg: Edema present.      Comments: 1+ edema of left foot and ankle   Skin:     General: Skin is warm and dry.      Findings: No rash.   Neurological:      General: No focal deficit present.      Mental Status: He is alert and oriented to person, place, and time.   Psychiatric:         Mood and Affect:  Mood normal.         Behavior: Behavior normal.           Lab Results   Component Value Date    WBC 8.48 09/06/2023    HGB 7.6 (L) 09/06/2023    HCT 29.0 (L) 09/06/2023     09/06/2023    CHOL 207 (H) 10/22/2013    TRIG 83 10/22/2013    HDL 43 10/22/2013    ALT 12 09/05/2023    AST 13 09/05/2023     09/06/2023    K 4.0 09/06/2023     09/06/2023    CREATININE 1.0 09/06/2023    BUN 9 09/06/2023    CO2 22 (L) 09/06/2023    TSH 0.905 10/22/2013    INR 1.0 09/05/2023    HGBA1C 5.8 10/22/2013       The ASCVD Risk score (Stephen MONROE, et al., 2019) failed to calculate for the following reasons:    Cannot find a previous HDL lab    Cannot find a previous total cholesterol lab    Assessment:         1. Encounter for screening and preventative care    2. DVT (deep venous thrombosis)    3. Iron deficiency anemia, unspecified iron deficiency anemia type    4. Class 2 obesity with body mass index (BMI) of 35.0 to 35.9 in adult, unspecified obesity type, unspecified whether serious comorbidity present          Plan:     1. DVT (deep venous thrombosis)  - unclear etiology.  Patient has authorized referral to Hematology for workup of unprovoked DVT.  He has some remaining pills from the Eliquis starter pack though he states he did miss for 5 days of treatment in between discharge and picking up the starter pack due to issues obtaining the medicine.  Maintenance dose of Eliquis ordered (three-month supply with 1 refill), for course of 6 months of treatment, however will defer to Hematology should they recommend a different treatment duration.  Patient is slightly risk of trauma due to his occupation as a , but is not otherwise a fall risk and has no other bleeding disorders.    2. Iron deficiency anemia, unspecified iron deficiency anemia type  - also unclear etiology.  Referrals to GI and Oncology are pending.  Patient denies noticing GI blood loss or melena.  We will likely need EGD and colonoscopy.      3. Encounter for screening and preventative care  - patient deferred HIV testing at this time stating he would like to get this done with his wife  - Hemoglobin A1C; Future  - LIPID PANEL; Future  - HEPATITIS C ANTIBODY; Future    4. Class 2 obesity with body mass index (BMI) of 35.0 to 35.9 in adult, unspecified obesity type, unspecified whether serious comorbidity present  - discussed GLP 1 agonist, will need to await A1c before ordering   - Hemoglobin A1C; Future  - LIPID PANEL; Future  - patient with borderline high blood pressure today, we will reassess at three-month follow-up.  Though he denies lightheadedness and syncope hesitant to start treatment until anemia is improved.     Health Maintenance  - Lipids: ordered  - A1C: ordered   - Immunizations:  Needs tetanus vaccine    All of your core healthy metrics are met.    Follow up in about 3 months (around 12/20/2023) for follow up of DVT. or sooner prn        Raj Bui  [facility]  [address] [address]  Phone [phone]  Fax [phone]    This note is dictated using the M*Modal Fluency Direct word recognition program. It may contain word recognition mistakes or wrong word substitutions that were missed on review.

## 2023-09-25 ENCOUNTER — LAB VISIT (OUTPATIENT)
Dept: LAB | Facility: OTHER | Age: 38
End: 2023-09-25
Attending: STUDENT IN AN ORGANIZED HEALTH CARE EDUCATION/TRAINING PROGRAM
Payer: COMMERCIAL

## 2023-09-25 DIAGNOSIS — Z00.00 ENCOUNTER FOR SCREENING AND PREVENTATIVE CARE: ICD-10-CM

## 2023-09-25 DIAGNOSIS — E66.9 CLASS 2 OBESITY WITH BODY MASS INDEX (BMI) OF 35.0 TO 35.9 IN ADULT, UNSPECIFIED OBESITY TYPE, UNSPECIFIED WHETHER SERIOUS COMORBIDITY PRESENT: ICD-10-CM

## 2023-09-25 LAB
CHOLEST SERPL-MCNC: 137 MG/DL (ref 120–199)
CHOLEST/HDLC SERPL: 3.7 {RATIO} (ref 2–5)
ESTIMATED AVG GLUCOSE: 97 MG/DL (ref 68–131)
HBA1C MFR BLD: 5 % (ref 4–5.6)
HCV AB SERPL QL IA: NORMAL
HDLC SERPL-MCNC: 37 MG/DL (ref 40–75)
HDLC SERPL: 27 % (ref 20–50)
LDLC SERPL CALC-MCNC: 89.2 MG/DL (ref 63–159)
NONHDLC SERPL-MCNC: 100 MG/DL
TRIGL SERPL-MCNC: 54 MG/DL (ref 30–150)

## 2023-09-25 PROCEDURE — 80061 LIPID PANEL: CPT | Performed by: STUDENT IN AN ORGANIZED HEALTH CARE EDUCATION/TRAINING PROGRAM

## 2023-09-25 PROCEDURE — 36415 COLL VENOUS BLD VENIPUNCTURE: CPT | Performed by: STUDENT IN AN ORGANIZED HEALTH CARE EDUCATION/TRAINING PROGRAM

## 2023-09-25 PROCEDURE — 83036 HEMOGLOBIN GLYCOSYLATED A1C: CPT | Performed by: STUDENT IN AN ORGANIZED HEALTH CARE EDUCATION/TRAINING PROGRAM

## 2023-09-25 PROCEDURE — 86803 HEPATITIS C AB TEST: CPT | Performed by: STUDENT IN AN ORGANIZED HEALTH CARE EDUCATION/TRAINING PROGRAM

## 2023-11-13 ENCOUNTER — HOSPITAL ENCOUNTER (EMERGENCY)
Facility: OTHER | Age: 38
Discharge: HOME | End: 2023-11-13
Attending: EMERGENCY MEDICINE
Payer: COMMERCIAL

## 2023-11-13 VITALS
OXYGEN SATURATION: 99 % | TEMPERATURE: 98 F | DIASTOLIC BLOOD PRESSURE: 91 MMHG | WEIGHT: 270 LBS | HEIGHT: 74 IN | HEART RATE: 91 BPM | SYSTOLIC BLOOD PRESSURE: 137 MMHG | RESPIRATION RATE: 19 BRPM | BODY MASS INDEX: 34.65 KG/M2

## 2023-11-13 DIAGNOSIS — R31.9 HEMATURIA, UNSPECIFIED TYPE: Primary | ICD-10-CM

## 2023-11-13 DIAGNOSIS — Z92.29 HISTORY OF ANTICOAGULANT USE: ICD-10-CM

## 2023-11-13 LAB
ALBUMIN SERPL BCP-MCNC: 3.8 G/DL (ref 3.5–5.2)
ALP SERPL-CCNC: 50 U/L (ref 55–135)
ALT SERPL W/O P-5'-P-CCNC: 14 U/L (ref 10–44)
ANION GAP SERPL CALC-SCNC: 5 MMOL/L (ref 8–16)
AST SERPL-CCNC: 15 U/L (ref 10–40)
BACTERIA #/AREA URNS HPF: ABNORMAL /HPF
BASOPHILS # BLD AUTO: 0.02 K/UL (ref 0–0.2)
BASOPHILS NFR BLD: 0.4 % (ref 0–1.9)
BILIRUB SERPL-MCNC: 0.9 MG/DL (ref 0.1–1)
BILIRUB UR QL STRIP: NEGATIVE
BUN SERPL-MCNC: 8 MG/DL (ref 6–20)
CALCIUM SERPL-MCNC: 9.2 MG/DL (ref 8.7–10.5)
CHLORIDE SERPL-SCNC: 109 MMOL/L (ref 95–110)
CLARITY UR: CLEAR
CO2 SERPL-SCNC: 26 MMOL/L (ref 23–29)
COLOR UR: YELLOW
CREAT SERPL-MCNC: 1.1 MG/DL (ref 0.5–1.4)
DIFFERENTIAL METHOD: ABNORMAL
EOSINOPHIL # BLD AUTO: 0.1 K/UL (ref 0–0.5)
EOSINOPHIL NFR BLD: 1.9 % (ref 0–8)
ERYTHROCYTE [DISTWIDTH] IN BLOOD BY AUTOMATED COUNT: 21.6 % (ref 11.5–14.5)
EST. GFR  (NO RACE VARIABLE): >60 ML/MIN/1.73 M^2
GLUCOSE SERPL-MCNC: 110 MG/DL (ref 70–110)
GLUCOSE UR QL STRIP: NEGATIVE
HCT VFR BLD AUTO: 33.1 % (ref 40–54)
HGB BLD-MCNC: 9.1 G/DL (ref 14–18)
HGB UR QL STRIP: ABNORMAL
HIV 1+2 AB+HIV1 P24 AG SERPL QL IA: NEGATIVE
HYALINE CASTS #/AREA URNS LPF: 0 /LPF
IMM GRANULOCYTES # BLD AUTO: 0.01 K/UL (ref 0–0.04)
IMM GRANULOCYTES NFR BLD AUTO: 0.2 % (ref 0–0.5)
KETONES UR QL STRIP: NEGATIVE
LEUKOCYTE ESTERASE UR QL STRIP: NEGATIVE
LYMPHOCYTES # BLD AUTO: 1.2 K/UL (ref 1–4.8)
LYMPHOCYTES NFR BLD: 23.9 % (ref 18–48)
MCH RBC QN AUTO: 18.8 PG (ref 27–31)
MCHC RBC AUTO-ENTMCNC: 27.5 G/DL (ref 32–36)
MCV RBC AUTO: 69 FL (ref 82–98)
MICROSCOPIC COMMENT: ABNORMAL
MONOCYTES # BLD AUTO: 0.4 K/UL (ref 0.3–1)
MONOCYTES NFR BLD: 7.9 % (ref 4–15)
NEUTROPHILS # BLD AUTO: 3.4 K/UL (ref 1.8–7.7)
NEUTROPHILS NFR BLD: 65.7 % (ref 38–73)
NITRITE UR QL STRIP: NEGATIVE
NRBC BLD-RTO: 0 /100 WBC
PH UR STRIP: 6 [PH] (ref 5–8)
PLATELET # BLD AUTO: 295 K/UL (ref 150–450)
PMV BLD AUTO: 8.2 FL (ref 9.2–12.9)
POTASSIUM SERPL-SCNC: 4 MMOL/L (ref 3.5–5.1)
PROT SERPL-MCNC: 7.3 G/DL (ref 6–8.4)
PROT UR QL STRIP: ABNORMAL
RBC # BLD AUTO: 4.83 M/UL (ref 4.6–6.2)
RBC #/AREA URNS HPF: >100 /HPF (ref 0–4)
SODIUM SERPL-SCNC: 140 MMOL/L (ref 136–145)
SP GR UR STRIP: 1.02 (ref 1–1.03)
SQUAMOUS #/AREA URNS HPF: 1 /HPF
URN SPEC COLLECT METH UR: ABNORMAL
UROBILINOGEN UR STRIP-ACNC: NEGATIVE EU/DL
WBC # BLD AUTO: 5.18 K/UL (ref 3.9–12.7)
WBC #/AREA URNS HPF: 4 /HPF (ref 0–5)

## 2023-11-13 PROCEDURE — 99283 EMERGENCY DEPT VISIT LOW MDM: CPT

## 2023-11-13 PROCEDURE — 80053 COMPREHEN METABOLIC PANEL: CPT | Performed by: PHYSICIAN ASSISTANT

## 2023-11-13 PROCEDURE — 87389 HIV-1 AG W/HIV-1&-2 AB AG IA: CPT | Performed by: EMERGENCY MEDICINE

## 2023-11-13 PROCEDURE — 81000 URINALYSIS NONAUTO W/SCOPE: CPT | Performed by: PHYSICIAN ASSISTANT

## 2023-11-13 PROCEDURE — 85025 COMPLETE CBC W/AUTO DIFF WBC: CPT | Performed by: PHYSICIAN ASSISTANT

## 2023-11-13 NOTE — FIRST PROVIDER EVALUATION
Emergency Department TeleTriage Encounter Note      CHIEF COMPLAINT    Chief Complaint   Patient presents with    Hematuria     Blood in urine onset today. Reports dysuria. Takes eliquis. Reports lower abd pain after urination.        VITAL SIGNS   Initial Vitals [11/13/23 1322]   BP Pulse Resp Temp SpO2   (!) 144/79 87 18 98.4 °F (36.9 °C) 99 %      MAP       --            ALLERGIES    Review of patient's allergies indicates:  No Known Allergies    PROVIDER TRIAGE NOTE  This is a teletriage evaluation of a 38 y.o. male presenting to the ED complaining of hematuria. Patient reports gross blood in urine since this morning. He has some dysuria, but has lower abdominal pain after urination.    Patient is alert and oriented. He speaks in complete sentences. He is sitting upright in the chair in no distress.     Initial orders will be placed and care will be transferred to an alternate provider when patient is roomed for a full evaluation. Any additional orders and the final disposition will be determined by that provider.         ORDERS  Labs Reviewed   HIV 1 / 2 ANTIBODY   CBC W/ AUTO DIFFERENTIAL   COMPREHENSIVE METABOLIC PANEL   URINALYSIS, REFLEX TO URINE CULTURE       ED Orders (720h ago, onward)      Start Ordered     Status Ordering Provider    11/13/23 1342 11/13/23 1341  CBC auto differential  STAT         Ordered TAYEDIAZ GJed    11/13/23 1342 11/13/23 1341  Comprehensive metabolic panel  STAT         Ordered TAYE DIAZ G.    11/13/23 1342 11/13/23 1341  Urinalysis, Reflex to Urine Culture Urine, Clean Catch  STAT         Ordered TAYEDIAZ GJed    11/13/23 1342 11/13/23 1341  Insert Saline lock IV  Once         Ordered TAYE DIAZ G.    11/13/23 1324 11/13/23 1323  HIV 1/2 Ag/Ab (4th Gen)  STAT         Ordered POONAM SANCHES              Virtual Visit Note: The provider triage portion of this emergency department evaluation and documentation was performed via [employer], a HIPAA-compliant telemedicine  application, in concert with a tele-presenter in the room. A face to face patient evaluation with one of my colleagues will occur once the patient is placed in an emergency department room.      DISCLAIMER: This note was prepared with quietrevolution voice recognition transcription software. Garbled syntax, mangled pronouns, and other bizarre constructions may be attributed to that software system.

## 2023-11-13 NOTE — ED PROVIDER NOTES
"     Source of History:  Patient     Chief complaint:  Hematuria (Blood in urine onset today. Reports dysuria. Takes eliquis. Reports lower abd pain after urination. )      HPI:  Andi Min is a 38 y.o. male presenting with hematuria.  Patient states that he noted this today.  States no passing of clots.  States that he noted bright red drops after urinating.  Did report some mild dysuria and suprapubic discomfort.  Patient is on anticoagulant therapy for suspected unprovoked PE/extensive left lower extremity DVT in September of this past year that underwent thrombectomy.  He has an appointment with Hematology in the near future.  He denies any fever, chills, penile discharge, scrotal pain, back pain or flank pain.  He is not tried any medications for the symptoms.  He denies any additional sites of bleeding      This is the extent to the patients complaints today here in the emergency department.    ROS: As per HPI     Review of patient's allergies indicates:  No Known Allergies    PMH:  As per HPI and below:  No past medical history on file.  Past Surgical History:   Procedure Laterality Date    PHLEBOGRAPHY N/A 9/6/2023    Procedure: Venogram;  Surgeon: Cortez Zamudio MD;  Location: [facility] CATH LAB;  Service: Interventional Radiology;  Laterality: N/A;       Social History     Tobacco Use    Smoking status: Every Day     Types: Vaping with nicotine    Smokeless tobacco: Never   Substance Use Topics    Alcohol use: Yes     Comment: socially    Drug use: No       Physical Exam:    BP (!) 137/91   Pulse 91   Temp 98.4 °F (36.9 °C) (Oral)   Resp 19   Ht 6' 2" (1.88 m)   Wt 122.5 kg (270 lb)   SpO2 99%   BMI 34.67 kg/m²   Nursing note and vital signs reviewed.  Constitutional: No acute distress.  Nontoxic  Eyes: No conjunctival injection.Extraocular muscles are intact.  ENT: Oropharynx clear.  Normal phonation.   Cardiovascular: Regular rate and rhythm.  No murmurs. No gallops. No " rubs  Respiratory: Clear to auscultation bilaterally.  Good air movement.  No wheezes.  No rhonchi. No rales. No accessory muscle use..  Abdomen: Soft.  Not distended.  Nontender.  No guarding.  No rebound. Non-peritoneal.  Musculoskeletal: Good range of motion all joints.  No deformities.  Neck supple.  No meningismus.  Skin: No rashes seen.  Good turgor.  No abrasions.  No ecchymoses.  Neurologic: Motor intact.  Sensation intact.  No ataxia. No focal neurological deficits.  Psych: Appropriate, conversant    Labs that have been ordered have been independently reviewed and interpreted by myself.    I decided to obtain the patient's medical records.      MDM/ Differential Dx:    Andi Min 38 y.o. presented to the ED with c/o hematuria.  Physical exam reveals well-appearing male in no distress.  No pallor icterus.  Abdomen is soft with no tenderness; no CVA tenderness.  Remaining exam unremarkable    DDX:  Acute hemorrhagic cystitis, pyelonephritis, medication side effect, bladder polyp, bladder cancer, renal cell carcinoma    ED management:  Patient seen in tele triage process were initial labs and urine were ordered.  Stable microcytic hyperchromic anemia.  Slightly improved from previous.  No signs of acute kidney injury.  Urine reveals over 100,000 red blood cells.  No signs of infectious process at this time.  Did review recent CT of abdomen in September with no acute abnormalities of kidneys or ureters at that time.  No clots or significant hematuria throughout ED course.  Discussed with urology and will arrange close follow-up for further outpatient evaluation of this gross hematuria.  Emphasized importance given his smoking history and anticoagulant loose.  Considered cessation of anticoagulant however as suspected possible unprovoked DVT believe he would benefit greater from this therapy than cessation      Impression/Plan: Patient informed of diagnosis The primary encounter diagnosis was  Hematuria, unspecified type. A diagnosis of History of anticoagulant use was also pertinent to this visit.  Patient will follow up with urology.  Patient cautioned on when to return to ED.  Pt. Understands and agrees with current treatment plan      Results for orders placed or performed during the hospital encounter of 11/13/23   HIV 1/2 Ag/Ab (4th Gen)   Result Value Ref Range    HIV 1/2 Ag/Ab Negative Negative   CBC auto differential   Result Value Ref Range    WBC 5.18 3.90 - 12.70 K/uL    RBC 4.83 4.60 - 6.20 M/uL    Hemoglobin 9.1 (L) 14.0 - 18.0 g/dL    Hematocrit 33.1 (L) 40.0 - 54.0 %    MCV 69 (L) 82 - 98 fL    MCH 18.8 (L) 27.0 - 31.0 pg    MCHC 27.5 (L) 32.0 - 36.0 g/dL    RDW 21.6 (H) 11.5 - 14.5 %    Platelets 295 150 - 450 K/uL    MPV 8.2 (L) 9.2 - 12.9 fL    Immature Granulocytes 0.2 0.0 - 0.5 %    Gran # (ANC) 3.4 1.8 - 7.7 K/uL    Immature Grans (Abs) 0.01 0.00 - 0.04 K/uL    Lymph # 1.2 1.0 - 4.8 K/uL    Mono # 0.4 0.3 - 1.0 K/uL    Eos # 0.1 0.0 - 0.5 K/uL    Baso # 0.02 0.00 - 0.20 K/uL    nRBC 0 0 /100 WBC    Gran % 65.7 38.0 - 73.0 %    Lymph % 23.9 18.0 - 48.0 %    Mono % 7.9 4.0 - 15.0 %    Eosinophil % 1.9 0.0 - 8.0 %    Basophil % 0.4 0.0 - 1.9 %    Differential Method Automated    Comprehensive metabolic panel   Result Value Ref Range    Sodium 140 136 - 145 mmol/L    Potassium 4.0 3.5 - 5.1 mmol/L    Chloride 109 95 - 110 mmol/L    CO2 26 23 - 29 mmol/L    Glucose 110 70 - 110 mg/dL    BUN 8 6 - 20 mg/dL    Creatinine 1.1 0.5 - 1.4 mg/dL    Calcium 9.2 8.7 - 10.5 mg/dL    Total Protein 7.3 6.0 - 8.4 g/dL    Albumin 3.8 3.5 - 5.2 g/dL    Total Bilirubin 0.9 0.1 - 1.0 mg/dL    Alkaline Phosphatase 50 (L) 55 - 135 U/L    AST 15 10 - 40 U/L    ALT 14 10 - 44 U/L    eGFR >60 >60 mL/min/1.73 m^2    Anion Gap 5 (L) 8 - 16 mmol/L   Urinalysis, Reflex to Urine Culture Urine, Clean Catch    Specimen: Urine   Result Value Ref Range    Specimen UA Urine, Clean Catch     Color, UA Yellow Yellow,  Straw, Melissa    Appearance, UA Clear Clear    pH, UA 6.0 5.0 - 8.0    Specific Gravity, UA 1.025 1.005 - 1.030    Protein, UA 1+ (A) Negative    Glucose, UA Negative Negative    Ketones, UA Negative Negative    Bilirubin (UA) Negative Negative    Occult Blood UA 3+ (A) Negative    Nitrite, UA Negative Negative    Urobilinogen, UA Negative <2.0 EU/dL    Leukocytes, UA Negative Negative   Urinalysis Microscopic   Result Value Ref Range    RBC, UA >100 (H) 0 - 4 /hpf    WBC, UA 4 0 - 5 /hpf    Bacteria Rare None-Occ /hpf    Squam Epithel, UA 1 /hpf    Hyaline Casts, UA 0 0-1/lpf /lpf    Microscopic Comment SEE COMMENT      Imaging Results    None                   Diagnostic Impression:    1. Hematuria, unspecified type    2. History of anticoagulant use         ED Disposition Condition    Discharge Stable            ED Prescriptions    None       Follow-up Information       Follow up With Specialties Details Why Contact Info    Yair Sy MD Urology Schedule an appointment as soon as possible for a visit   [address] [place] [address] [id number] [phone]      Barbi Lane NP Urology Schedule an appointment as soon as possible for a visit   [address] [place] [id number]  [phone]               Alicia Tena PA  11/13/23 5518

## 2023-11-13 NOTE — ED TRIAGE NOTES
Hematuria with right sided abdominal pain since this morning. No N/V, fever reported. Presents in no distress.

## 2023-11-13 NOTE — Clinical Note
"Andi Franklin"Mechelle was seen and treated in our emergency department on 11/13/2023.  He may return to work on 11/14/2023.       If you have any questions or concerns, please don't hesitate to call.      Alicia Tena PA"

## 2023-11-14 ENCOUNTER — TELEPHONE (OUTPATIENT)
Dept: UROLOGY | Facility: CLINIC | Age: 38
End: 2023-11-14
Payer: COMMERCIAL

## 2023-11-14 NOTE — TELEPHONE ENCOUNTER
----- Message from Barbi Lane NP sent at 11/14/2023  7:28 AM CST -----  Please schedule a visit with me or next available with physicians for ER follow up. Thanks    ----- Message -----  From: Alicia Tena PA  Sent: 11/13/2023   3:32 PM CST  To: PRATIBHA Alvarez,    I spoke with Dr. Gaines regarding this patient.  Can we get him with you all for further outpatient evaluation of hematuria. Thanks and hope you are well.    Alicia Tena

## 2023-12-11 PROBLEM — I26.99 BILATERAL PULMONARY EMBOLISM: Status: RESOLVED | Noted: 2023-09-05 | Resolved: 2023-12-11

## 2024-11-22 ENCOUNTER — OCCUPATIONAL HEALTH (OUTPATIENT)
Dept: URGENT CARE | Facility: CLINIC | Age: 39
End: 2024-11-22

## 2024-11-22 DIAGNOSIS — Z00.00 ENCOUNTER FOR PHYSICAL EXAMINATION: Primary | ICD-10-CM

## 2025-02-21 NOTE — ED PROVIDER NOTES
"  Source of History:  Patient and wife at the bedside    Chief complaint:  Leg Pain (Pt reporting L leg pain and swelling x 1 week. Denies injury to area. Upon assessment, L leg appears swollen from thigh to ankle. Pt ambulatory upon arrival to ED. Pt reporting same L leg pain x 2 years ago and was told "I maybe have a torn meniscus and I was supposed to have a MRI done, but didn't go." )      HPI:  Andi Min is a 38 y.o. male presenting with left leg pain and swelling.  Patient states that he 1st noted area proximally 1 week ago.  He states pain is located to the posterior thigh and radiates to the posterior calf and foot.  He does report some subjective swelling of the left calf.  He denies any recent falls or trauma.  States that he has known previous probable meniscal injury to the knee however did not have MRI.  Denies any chest pain or shortness of breath.  Of note he had superficial venous thrombosis of the contralateral leg 2 years ago.  Denies any known bleeding or clotting disorder.  Does report his father had a blood clot that he believes was after surgery.  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    ROS: As per HPI and below:  Constitutional: No fever.  No chills. + Fatigue  Eyes: No visual changes.   ENT: No sore throat. No ear pain.  Urinary: No abnormal urination.  Cardiac:  No chest pain, no palpitation  Respiratory:  No shortness of breath  MSK:  Flare extremity pain and swelling  Integument: No rashes or lesions.    Review of patient's allergies indicates:  No Known Allergies    PMH:  As per HPI and below:  History reviewed. No pertinent past medical history.  History reviewed. No pertinent surgical history.    Social History     Tobacco Use    Smoking status: Every Day     Types: Vaping with nicotine    Smokeless tobacco: Never   Substance Use Topics    " Chief Complaint: .Pavan Hamilton is here today for No chief complaint on file.         Medications: medications verified and updated    Refills needed today?  NO    Latex allergy or sensitivity: No known latex allergy     Patient would like communication of their results via:  [employer]    Cell Phone:   Telephone Information:   Mobile [phone]     Okay to leave a message containing results? Yes    Patient's current [facility] status: Active.    Advanced directives: discussed and on file    Care Teams Verified: No Changes    Depression Screen: yes    Tobacco history: verified       Health Maintenance       Pneumococcal Vaccine 50+ (1 of 1 - PCV)  Never done    Influenza Vaccine (1)  Never done    COVID-19 Vaccine (5 - 2024-25 season)  Overdue since 9/1/2024           Following review of the above:  Patient is not proceeding with: Influenza and Pneumococcal    Note: Refer to final orders and clinician documentation.             "Alcohol use: Yes     Comment: socially    Drug use: No       Physical Exam:    /71 (BP Location: Left arm)   Pulse 74   Temp 98.3 °F (36.8 °C) (Skin)   Resp 16   Ht 6' 2" (1.88 m)   Wt 117.9 kg (259 lb 14.8 oz)   SpO2 100%   BMI 33.37 kg/m²   Nursing note and vital signs reviewed.  Constitutional: No acute distress.  Eyes: No conjunctival injection.  Extraocular muscles are intact.  ENT: Normal phonation.  Cardiac:  Normal rate  Respiratory:  No respiratory distress.  Lungs CTA  Musculoskeletal:  Fair range of motion of affected left lower extremity.  Edema and warmth of the left calf.  no obvious palpable cord.  Compartments are supple.  No obvious bony deformity or laxity.  Neurovascularly intact.   Skin:  See MSK, No rashes seen.  Good turgor.  No abrasions.  No ecchymoses.  Psych: Appropriate, conversant.        I decided to obtain the patient's medical records.    Results for orders placed or performed during the hospital encounter of 09/05/23   CBC auto differential   Result Value Ref Range    WBC 7.72 3.90 - 12.70 K/uL    RBC 4.41 (L) 4.60 - 6.20 M/uL    Hemoglobin 7.6 (L) 14.0 - 18.0 g/dL    Hematocrit 28.9 (L) 40.0 - 54.0 %    MCV 66 (L) 82 - 98 fL    MCH 17.2 (L) 27.0 - 31.0 pg    MCHC 26.3 (L) 32.0 - 36.0 g/dL    RDW 22.5 (H) 11.5 - 14.5 %    Platelets 352 150 - 450 K/uL    MPV 8.6 (L) 9.2 - 12.9 fL    Immature Granulocytes 0.3 0.0 - 0.5 %    Gran # (ANC) 5.3 1.8 - 7.7 K/uL    Immature Grans (Abs) 0.02 0.00 - 0.04 K/uL    Lymph # 1.5 1.0 - 4.8 K/uL    Mono # 0.6 0.3 - 1.0 K/uL    Eos # 0.2 0.0 - 0.5 K/uL    Baso # 0.04 0.00 - 0.20 K/uL    nRBC 0 0 /100 WBC    Gran % 69.1 38.0 - 73.0 %    Lymph % 19.9 18.0 - 48.0 %    Mono % 7.9 4.0 - 15.0 %    Eosinophil % 2.3 0.0 - 8.0 %    Basophil % 0.5 0.0 - 1.9 %    Differential Method Automated    Comprehensive metabolic panel   Result Value Ref Range    Sodium 139 136 - 145 mmol/L    Potassium 3.8 3.5 - 5.1 mmol/L    Chloride 111 (H) 95 - 110 mmol/L    " CO2 20 (L) 23 - 29 mmol/L    Glucose 116 (H) 70 - 110 mg/dL    BUN 10 6 - 20 mg/dL    Creatinine 1.1 0.5 - 1.4 mg/dL    Calcium 9.1 8.7 - 10.5 mg/dL    Total Protein 7.4 6.0 - 8.4 g/dL    Albumin 3.7 3.5 - 5.2 g/dL    Total Bilirubin 0.8 0.1 - 1.0 mg/dL    Alkaline Phosphatase 50 (L) 55 - 135 U/L    AST 13 10 - 40 U/L    ALT 12 10 - 44 U/L    eGFR >60 >60 mL/min/1.73 m^2    Anion Gap 8 8 - 16 mmol/L   APTT   Result Value Ref Range    aPTT 27.4 21.0 - 32.0 sec   Protime-INR   Result Value Ref Range    Prothrombin Time 11.2 9.0 - 12.5 sec    INR 1.0 0.8 - 1.2   CBC auto differential   Result Value Ref Range    WBC 7.48 3.90 - 12.70 K/uL    RBC 4.25 (L) 4.60 - 6.20 M/uL    Hemoglobin 7.4 (L) 14.0 - 18.0 g/dL    Hematocrit 27.9 (L) 40.0 - 54.0 %    MCV 66 (L) 82 - 98 fL    MCH 17.4 (L) 27.0 - 31.0 pg    MCHC 26.5 (L) 32.0 - 36.0 g/dL    RDW 22.5 (H) 11.5 - 14.5 %    Platelets 345 150 - 450 K/uL    MPV 8.9 (L) 9.2 - 12.9 fL    Immature Granulocytes 0.3 0.0 - 0.5 %    Gran # (ANC) 4.7 1.8 - 7.7 K/uL    Immature Grans (Abs) 0.02 0.00 - 0.04 K/uL    Lymph # 1.8 1.0 - 4.8 K/uL    Mono # 0.8 0.3 - 1.0 K/uL    Eos # 0.2 0.0 - 0.5 K/uL    Baso # 0.04 0.00 - 0.20 K/uL    nRBC 0 0 /100 WBC    Gran % 62.1 38.0 - 73.0 %    Lymph % 24.1 18.0 - 48.0 %    Mono % 10.7 4.0 - 15.0 %    Eosinophil % 2.3 0.0 - 8.0 %    Basophil % 0.5 0.0 - 1.9 %    Differential Method Automated    Iron and TIBC   Result Value Ref Range    Iron 23 (L) 45 - 160 ug/dL    Transferrin 280 200 - 375 mg/dL    TIBC 414 250 - 450 ug/dL    Saturated Iron 6 (L) 20 - 50 %   Ferritin   Result Value Ref Range    Ferritin 13 (L) 20.0 - 300.0 ng/mL   Reticulocytes   Result Value Ref Range    Retic 1.8 0.4 - 2.0 %   Lactate dehydrogenase   Result Value Ref Range     110 - 260 U/L   APTT   Result Value Ref Range    aPTT 121.0 (H) 21.0 - 32.0 sec   APTT   Result Value Ref Range    aPTT 73.2 (H) 21.0 - 32.0 sec   CBC auto differential   Result Value Ref Range    WBC  8.15 3.90 - 12.70 K/uL    RBC 4.47 (L) 4.60 - 6.20 M/uL    Hemoglobin 7.8 (L) 14.0 - 18.0 g/dL    Hematocrit 29.4 (L) 40.0 - 54.0 %    MCV 66 (L) 82 - 98 fL    MCH 17.4 (L) 27.0 - 31.0 pg    MCHC 26.5 (L) 32.0 - 36.0 g/dL    RDW 22.7 (H) 11.5 - 14.5 %    Platelets 387 150 - 450 K/uL    MPV 8.7 (L) 9.2 - 12.9 fL    Immature Granulocytes 0.4 0.0 - 0.5 %    Gran # (ANC) 4.0 1.8 - 7.7 K/uL    Immature Grans (Abs) 0.03 0.00 - 0.04 K/uL    Lymph # 3.2 1.0 - 4.8 K/uL    Mono # 0.6 0.3 - 1.0 K/uL    Eos # 0.3 0.0 - 0.5 K/uL    Baso # 0.06 0.00 - 0.20 K/uL    nRBC 0 0 /100 WBC    Gran % 49.0 38.0 - 73.0 %    Lymph % 38.9 18.0 - 48.0 %    Mono % 7.7 4.0 - 15.0 %    Eosinophil % 3.3 0.0 - 8.0 %    Basophil % 0.7 0.0 - 1.9 %    Differential Method Automated    Basic Metabolic Panel   Result Value Ref Range    Sodium 140 136 - 145 mmol/L    Potassium 4.0 3.5 - 5.1 mmol/L    Chloride 108 95 - 110 mmol/L    CO2 22 (L) 23 - 29 mmol/L    Glucose 103 70 - 110 mg/dL    BUN 9 6 - 20 mg/dL    Creatinine 1.0 0.5 - 1.4 mg/dL    Calcium 9.0 8.7 - 10.5 mg/dL    Anion Gap 10 8 - 16 mmol/L    eGFR >60 >60 mL/min/1.73 m^2   Pathologist Interpretation Differential   Result Value Ref Range    Pathologist Review Review required    CBC Auto Differential   Result Value Ref Range    WBC 7.07 3.90 - 12.70 K/uL    RBC 4.11 (L) 4.60 - 6.20 M/uL    Hemoglobin 7.1 (L) 14.0 - 18.0 g/dL    Hematocrit 26.6 (L) 40.0 - 54.0 %    MCV 65 (L) 82 - 98 fL    MCH 17.3 (L) 27.0 - 31.0 pg    MCHC 26.7 (L) 32.0 - 36.0 g/dL    RDW 22.2 (H) 11.5 - 14.5 %    Platelets 339 150 - 450 K/uL    MPV 8.8 (L) 9.2 - 12.9 fL    Immature Granulocytes 0.7 (H) 0.0 - 0.5 %    Gran # (ANC) 4.1 1.8 - 7.7 K/uL    Immature Grans (Abs) 0.05 (H) 0.00 - 0.04 K/uL    Lymph # 2.1 1.0 - 4.8 K/uL    Mono # 0.6 0.3 - 1.0 K/uL    Eos # 0.2 0.0 - 0.5 K/uL    Baso # 0.05 0.00 - 0.20 K/uL    nRBC 0 0 /100 WBC    Gran % 58.3 38.0 - 73.0 %    Lymph % 29.1 18.0 - 48.0 %    Mono % 8.2 4.0 - 15.0 %     Eosinophil % 3.0 0.0 - 8.0 %    Basophil % 0.7 0.0 - 1.9 %    Differential Method Automated    APTT   Result Value Ref Range    aPTT 85.1 (H) 21.0 - 32.0 sec     Imaging Results              IR Thrombectomy Veins Inc Thrombolysis and Fluoro (In process)                      CT Abdomen Pelvis With Contrast (Final result)  Result time 09/05/23 15:43:45      Final result by Cortez Zamudio MD (09/05/23 15:43:45)                   Impression:      While the study is essentially nondiagnostic for evaluation of pulmonary embolus due to poor opacification of the pulmonary arteries.  There does appear to be small emboli in the bilateral lower lobe pulmonary arteries    No acute abdominal process or underlying mass lesion to explain the patient's left lower extremity DVT.    Cholelithiasis without evidence of acute cholecystitis.      Electronically signed by: Cortez Zamudio MD  Date:    09/05/2023  Time:    15:43               Narrative:    EXAMINATION:  CT ABDOMEN PELVIS WITH CONTRAST; CTA CHEST NON CORONARY (PE STUDIES)    CLINICAL HISTORY:  left lower extremity DVT;; Pulmonary embolism (PE) suspected, unknown D-dimer;    TECHNIQUE:  Low dose axial images, sagittal and coronal reformations were obtained from the lung apices to the pubic symphysis following the IV administration of 100 mL of Omnipaque 350 .  Oral contrast was not given.  Images of the chest were obtained per PE protocol.  Coronal and sagittal reformats were provided.    COMPARISON:  None.    FINDINGS:  Limited evaluation of structures at the base the chest show no concerning masses or nodules.    The heart is normal in size.  No pericardial effusion.  No mediastinal or hilar adenopathy.    The study is nondiagnostic for pulmonary embolus due to poor contrast opacification of the pulmonary vessels.  However, there does appear to be small segmental filling defects in the right lower lobe pulmonary artery.  Questionable defects in the left lower lobe  pulmonary artery as well.    The lungs are clear.  No concerning masses, nodules, or consolidations.  No effusion or pneumothorax.  The airways are patent without endoluminal lesion.    The liver is slightly enlarged in size.  No solid mass or biliary ductal dilatation.  Gallbladder contains multiple gallstones.  No evidence of acute cholecystitis.    Spleen, adrenal glands, and pancreas show no focal abnormalities.    Bilateral kidneys are normal in size and position.    Gastrointestinal tract shows no evidence of obstruction.  There is a moderate-sized hiatal hernia.  Normal appendix is present.  No free fluid or free gas.  No concerning abdominal adenopathy.    Visualized vascular structures show normal course and caliber.    Osseous structures are intact.  No evidence of an acute fracture or destructive osseous lesion.                                       CTA Chest Non-Coronary (PE Studies) (Final result)  Result time 09/05/23 15:43:45      Final result by Cortez Zamudio MD (09/05/23 15:43:45)                   Impression:      While the study is essentially nondiagnostic for evaluation of pulmonary embolus due to poor opacification of the pulmonary arteries.  There does appear to be small emboli in the bilateral lower lobe pulmonary arteries    No acute abdominal process or underlying mass lesion to explain the patient's left lower extremity DVT.    Cholelithiasis without evidence of acute cholecystitis.      Electronically signed by: Cortez Zamudio MD  Date:    09/05/2023  Time:    15:43               Narrative:    EXAMINATION:  CT ABDOMEN PELVIS WITH CONTRAST; CTA CHEST NON CORONARY (PE STUDIES)    CLINICAL HISTORY:  left lower extremity DVT;; Pulmonary embolism (PE) suspected, unknown D-dimer;    TECHNIQUE:  Low dose axial images, sagittal and coronal reformations were obtained from the lung apices to the pubic symphysis following the IV administration of 100 mL of Omnipaque 350 .  Oral contrast was  not given.  Images of the chest were obtained per PE protocol.  Coronal and sagittal reformats were provided.    COMPARISON:  None.    FINDINGS:  Limited evaluation of structures at the base the chest show no concerning masses or nodules.    The heart is normal in size.  No pericardial effusion.  No mediastinal or hilar adenopathy.    The study is nondiagnostic for pulmonary embolus due to poor contrast opacification of the pulmonary vessels.  However, there does appear to be small segmental filling defects in the right lower lobe pulmonary artery.  Questionable defects in the left lower lobe pulmonary artery as well.    The lungs are clear.  No concerning masses, nodules, or consolidations.  No effusion or pneumothorax.  The airways are patent without endoluminal lesion.    The liver is slightly enlarged in size.  No solid mass or biliary ductal dilatation.  Gallbladder contains multiple gallstones.  No evidence of acute cholecystitis.    Spleen, adrenal glands, and pancreas show no focal abnormalities.    Bilateral kidneys are normal in size and position.    Gastrointestinal tract shows no evidence of obstruction.  There is a moderate-sized hiatal hernia.  Normal appendix is present.  No free fluid or free gas.  No concerning abdominal adenopathy.    Visualized vascular structures show normal course and caliber.    Osseous structures are intact.  No evidence of an acute fracture or destructive osseous lesion.                                       US Lower Extremity Veins Left (Final result)  Result time 09/05/23 11:36:34      Final result by Zena Armendariz MD (09/05/23 11:36:34)                   Impression:      Nearly occlusive thrombus involving the left femoral vein, popliteal vein, posterior tibial and peroneal veins.    Result relayed to Nurse Davis providing care for this patient which will relay to attending.  11:35.    No EPIC response from the ordering provider.    I could not reach Jolene  Melvina RICHARDS by phone.      Electronically signed by: Zena Armendariz MD  Date:    09/05/2023  Time:    11:36               Narrative:    EXAMINATION:  US LOWER EXTREMITY VEINS LEFT    CLINICAL HISTORY:  Other specified soft tissue disorders    TECHNIQUE:  Duplex and color flow Doppler evaluation and graded compression of the left lower extremity veins was performed.    COMPARISON:  05/20/2021    FINDINGS:  The common femoral is patent.  The left mid and distal femoral vein are thrombosed.  The left popliteal vein is thrombosed.  The left posterior tibial and peroneal veins are thrombosed.  The anterior tibial vein is patent.  The greater saphenous vein is patent.    Contralateral CFV: The contralateral (right) common femoral vein is patent and free of thrombus.    Miscellaneous: None                                  Medical Decision Making  Amount and/or Complexity of Data Reviewed  Independent Historian: spouse  External Data Reviewed: labs.  Labs: ordered.  Radiology: ordered.    Risk  Prescription drug management.  Decision regarding hospitalization.  Minor surgery with identified risk factors.          Andi Min 38 y.o. presented to the ED with c/o left lower extremity pain and swelling.  Physical exam reveals  Fair range of motion of affected left lower extremity.  Edema and warmth of the left calf.  no obvious palpable cord.  Compartments are supple.  No obvious bony deformity.  Neurovascularly intact.    Differential Diagnosis includes, but is not limited to:  Fracture, dislocation, compartment syndrome, nerve injury/palsy, vascular injury, DVT, rhabdomyolysis, hemarthrosis, septic joint, cellulitis, bursitis, muscle strain, ligament tear/sprain, laceration, foreign body, abrasion, soft tissue contusion, osteoarthritis.      ED management:  Patient seen in tele triage process and given complaints and presentation initial labs were ordered left lower extremity ultrasound ordered.  Labs  notable for microcytic hyperchromic anemia.  Unknown etiology.  Denies any blood loss.  Will send iron studies.  Blood glucose mildly elevated.  Ultrasound does reveal nearly occlusive thrombus of the left femoral, popliteal, posterior tibial in peroneal veins.  Given the extent did discuss with Interventional Radiology he believes patient would be a good candidate for thrombectomy via IR(Dr. Zamudio).  Recommended Hospitalization with placement to  with heparin drip.  And NPO at midnight to undergo thrombectomy tomorrow.      Impression/Plan: Patient informed of diagnosis The primary encounter diagnosis was Iron deficiency anemia, unspecified iron deficiency anemia type. Diagnoses of Leg swelling, Acute deep vein thrombosis (DVT) of left lower extremity, and DVT (deep venous thrombosis) were also pertinent to this visit.      ED Course as of 09/06/23 1505   Tue Sep 05, 2023   1337 IR did request CT abdomen and pelvis.  Patient has no risk factors to suggest PE as he denies any chest pain, shortness of breath and has no tachycardia or hypoxia however given the CT orders will extend to chest for better assessment [LC]      ED Course User Index  [LC] Alicia Tena PA        Diagnostic Impression:    1. Iron deficiency anemia, unspecified iron deficiency anemia type    2. Leg swelling    3. Acute deep vein thrombosis (DVT) of left lower extremity    4. DVT (deep venous thrombosis)         ED Disposition Condition    Observation                  Please pardon typos or dictation errors, as this note was transcribed using M*Modal fluency direct dictation software.      Alicia Tena PA  09/06/23 1500

## (undated) DEVICE — CATH CLOTTRIEVER BOLD 105CM

## (undated) DEVICE — INTRODUCER CATH 5F 11CM

## (undated) DEVICE — TRAY ANGIO BAPTIST

## (undated) DEVICE — GUIDEWIRE LAUREATE 035IN 180CM

## (undated) DEVICE — GUIDEWIRE AMPLATZ STIFF 260X7

## (undated) DEVICE — Device

## (undated) DEVICE — SET MICPUNC ACC STIFF CANNULA

## (undated) DEVICE — CATH VER 135 4FR

## (undated) DEVICE — CATH ANGIO BRAID BERENSTEIN 5F

## (undated) DEVICE — SHEATH CLOTTRIEVER 13FR